# Patient Record
Sex: FEMALE | Race: WHITE | NOT HISPANIC OR LATINO | Employment: OTHER
[De-identification: names, ages, dates, MRNs, and addresses within clinical notes are randomized per-mention and may not be internally consistent; named-entity substitution may affect disease eponyms.]

---

## 2021-05-05 ENCOUNTER — TRANSCRIBE ORDERS (OUTPATIENT)
Dept: OTHER | Age: 81
End: 2021-05-05

## 2021-05-05 DIAGNOSIS — K86.1 CHRONIC CALCIFIC PANCREATITIS (H): Primary | ICD-10-CM

## 2021-05-06 ENCOUNTER — TELEPHONE (OUTPATIENT)
Dept: GASTROENTEROLOGY | Facility: CLINIC | Age: 81
End: 2021-05-06

## 2021-05-06 NOTE — TELEPHONE ENCOUNTER
Advanced Endoscopy     Referring provider:  Kathy Meza from [facility]    Referred to: [facility] Endoscopy [facility]     Provider Requested:  NA     Referral Received: 5/6/21     Records received: in CareEverywhere     Images received: none yet    Evaluation for:  idiopathic chronic calcific pancreatitis     Clinical History (per RN review):     Admission 5/3/21- 5/5/21  Hospital Course   Patient is 80 years old woman with medical condition including chronic idiopathic pancreatitis with known strictures and pancreatic duct dilatation, recurrent pancreatitis flareups, type 2 diabetes mellitus, non-insulin-dependent, asthma, hypertension, dyslipidemia who presented to outside hospital with complaint of abdominal pain, CT abdomen with concerns of choledocholithiasis, (It was an erroneous read, per radiologist report there is no evidence of choledocholithiasis, stones present in pancreatic duct). She was transferred to [facility] for GI consult and possible ERCP. Patient was seen in consultation by GI. In absence of elevated lipase and improving pain, she was not felt to have acute exacerbation of pancreatitis. Stool for elastase ordered to make sure she is on proper dosing of Creon. For management of chronic pancreatitis, GI placed a consult with [facility] biliary program.    CT 5/3/21- requested mailed 5/6/21    CONCLUSION:Chronic findings again identified throughout the abdomen and pelvis; particularly,   finding compatible with severe chronic pancreatitis appears quiescent on current examination.    The stones described by [facility] within the common bile duct are indeed within the pancreatic duct   and they are the result of chronic pancreatitis.  There is no evidence of choledocholithiasis.    Cholelithiasis again identified however but there are no inflammatory changes today   surrounding the pancreas.  No other acute abdominal or pelvic abnormalities are seen.  No other   discrete interval changes are  noted.  Incidentally, tiny pulmonary nodule at the right lung   base stable since 2019 and therefore definitely benign and needs no further evaluation or   monitoring.      MRCP 12/2020- requested push 5/6/21  IMPRESSION:   1.  Since previous examination there has been interval decrease in   the size of the pancreatic duct. Currently this measures up to 4 mm   in size and has a beaded appearance likely due to chronic changes of   pancreatitis. No evidence for acute pancreatitis.  2.  There are several small cystic areas within the pancreatic   parenchyma along the main pancreatic duct. These were present on the   previous MRI and most likely reflect residual side branch ductal   dilatation although these too have decreased in size. Less likely   consideration would be small residual pseudocysts or side branch IPMT.  3.  Benign appearing hepatic cyst requiring no follow-up.  4.  Cholelithiasis.  5.  There is no evidence for biliary ductal dilatation.    MD review date: 5/6/21  MD Decision for clinic consultation/Orders:            Referral updates/Patient contacted:

## 2021-05-13 NOTE — TELEPHONE ENCOUNTER
Per Dr. Haines  Clinic after imaging arrives.    Imaging previously requested mailed/pushed 5/6, not received yet. Called to  Update patient.     Left message.    ML

## 2021-05-17 NOTE — TELEPHONE ENCOUNTER
Patients sister Susan called, noting patient admitted again with flare and plans follow locally at this time. They will reach out with future needs.    ML

## 2021-09-10 PROCEDURE — 88305 TISSUE EXAM BY PATHOLOGIST: CPT | Mod: TC,ORL | Performed by: INTERNAL MEDICINE

## 2021-09-10 PROCEDURE — 88305 TISSUE EXAM BY PATHOLOGIST: CPT | Performed by: PATHOLOGY

## 2021-09-13 ENCOUNTER — LAB REQUISITION (OUTPATIENT)
Dept: LAB | Facility: CLINIC | Age: 81
End: 2021-09-13
Payer: MEDICARE

## 2021-09-13 DIAGNOSIS — K92.2 GASTROINTESTINAL HEMORRHAGE, UNSPECIFIED: ICD-10-CM

## 2021-09-14 LAB
PATH REPORT.COMMENTS IMP SPEC: NORMAL
PATH REPORT.COMMENTS IMP SPEC: NORMAL
PATH REPORT.FINAL DX SPEC: NORMAL
PATH REPORT.GROSS SPEC: NORMAL
PATH REPORT.MICROSCOPIC SPEC OTHER STN: NORMAL
PATH REPORT.RELEVANT HX SPEC: NORMAL
PHOTO IMAGE: NORMAL

## 2021-09-14 PROCEDURE — 88305 TISSUE EXAM BY PATHOLOGIST: CPT | Mod: 26 | Performed by: PATHOLOGY

## 2023-07-09 ENCOUNTER — NURSE TRIAGE (OUTPATIENT)
Dept: NURSING | Facility: CLINIC | Age: 83
End: 2023-07-09
Payer: MEDICARE

## 2023-07-09 NOTE — TELEPHONE ENCOUNTER
Patient has procedure tomorrow with Dr Foreman wondering if she should take her medications in the morning or hold them. No notes from [facility] or nothing seen from external sourse for pre op, patient does not have to arrive until 11:30 so writer told her to call [facility] in the morning.   Cherry Mckenzie RN on 7/9/2023 at 4:44 PM      Reason for Disposition    [1] Caller requesting NON-URGENT health information AND [2] PCP's office is the best resource    Additional Information    Negative: [1] Caller is not with the adult (patient) AND [2] reporting urgent symptoms    Negative: Lab result questions    Negative: Medication questions    Negative: Caller can't be reached by phone    Negative: Caller has already spoken to PCP or another triager    Negative: RN needs further essential information from caller in order to complete triage    Protocols used: INFORMATION ONLY CALL - NO TRIAGE-A-

## 2023-07-10 ENCOUNTER — ANESTHESIA EVENT (OUTPATIENT)
Dept: SURGERY | Facility: CLINIC | Age: 83
End: 2023-07-10
Payer: MEDICARE

## 2023-07-10 ENCOUNTER — ANESTHESIA (OUTPATIENT)
Dept: SURGERY | Facility: CLINIC | Age: 83
End: 2023-07-10
Payer: MEDICARE

## 2023-07-10 ENCOUNTER — HOSPITAL ENCOUNTER (OUTPATIENT)
Facility: CLINIC | Age: 83
Setting detail: OBSERVATION
Discharge: HOME | End: 2023-07-11
Attending: INTERNAL MEDICINE | Admitting: STUDENT IN AN ORGANIZED HEALTH CARE EDUCATION/TRAINING PROGRAM
Payer: MEDICARE

## 2023-07-10 ENCOUNTER — APPOINTMENT (OUTPATIENT)
Dept: GENERAL RADIOLOGY | Facility: CLINIC | Age: 83
End: 2023-07-10
Attending: INTERNAL MEDICINE
Payer: MEDICARE

## 2023-07-10 ENCOUNTER — TELEPHONE (OUTPATIENT)
Dept: NURSING | Facility: CLINIC | Age: 83
End: 2023-07-10
Payer: MEDICARE

## 2023-07-10 DIAGNOSIS — Z98.890 S/P ERCP: Primary | ICD-10-CM

## 2023-07-10 LAB
ERCP: NORMAL
UPPER EUS: NORMAL

## 2023-07-10 PROCEDURE — 99222 1ST HOSP IP/OBS MODERATE 55: CPT | Mod: AI | Performed by: PHYSICIAN ASSISTANT

## 2023-07-10 PROCEDURE — 250N000011 HC RX IP 250 OP 636: Mod: JZ | Performed by: PHYSICIAN ASSISTANT

## 2023-07-10 PROCEDURE — 250N000013 HC RX MED GY IP 250 OP 250 PS 637: Performed by: PHYSICIAN ASSISTANT

## 2023-07-10 PROCEDURE — 360N000083 HC SURGERY LEVEL 3 W/ FLUORO, PER MIN: Performed by: INTERNAL MEDICINE

## 2023-07-10 PROCEDURE — C1769 GUIDE WIRE: HCPCS | Performed by: INTERNAL MEDICINE

## 2023-07-10 PROCEDURE — 272N000001 HC OR GENERAL SUPPLY STERILE: Performed by: INTERNAL MEDICINE

## 2023-07-10 PROCEDURE — 96361 HYDRATE IV INFUSION ADD-ON: CPT

## 2023-07-10 PROCEDURE — 96375 TX/PRO/DX INJ NEW DRUG ADDON: CPT

## 2023-07-10 PROCEDURE — 250N000009 HC RX 250: Performed by: ANESTHESIOLOGY

## 2023-07-10 PROCEDURE — 999N000141 HC STATISTIC PRE-PROCEDURE NURSING ASSESSMENT: Performed by: INTERNAL MEDICINE

## 2023-07-10 PROCEDURE — 250N000011 HC RX IP 250 OP 636: Performed by: ANESTHESIOLOGY

## 2023-07-10 PROCEDURE — 710N000009 HC RECOVERY PHASE 1, LEVEL 1, PER MIN: Performed by: INTERNAL MEDICINE

## 2023-07-10 PROCEDURE — 74330 X-RAY BILE/PANC ENDOSCOPY: CPT | Mod: TC

## 2023-07-10 PROCEDURE — 370N000017 HC ANESTHESIA TECHNICAL FEE, PER MIN: Performed by: INTERNAL MEDICINE

## 2023-07-10 PROCEDURE — 258N000003 HC RX IP 258 OP 636: Performed by: ANESTHESIOLOGY

## 2023-07-10 PROCEDURE — 250N000025 HC SEVOFLURANE, PER MIN: Performed by: INTERNAL MEDICINE

## 2023-07-10 PROCEDURE — 258N000003 HC RX IP 258 OP 636: Performed by: INTERNAL MEDICINE

## 2023-07-10 PROCEDURE — G0378 HOSPITAL OBSERVATION PER HR: HCPCS

## 2023-07-10 PROCEDURE — 96376 TX/PRO/DX INJ SAME DRUG ADON: CPT | Mod: XU

## 2023-07-10 PROCEDURE — 250N000011 HC RX IP 250 OP 636: Mod: JZ | Performed by: ANESTHESIOLOGY

## 2023-07-10 PROCEDURE — 96374 THER/PROPH/DIAG INJ IV PUSH: CPT

## 2023-07-10 PROCEDURE — 258N000003 HC RX IP 258 OP 636: Performed by: NURSE ANESTHETIST, CERTIFIED REGISTERED

## 2023-07-10 PROCEDURE — C2617 STENT, NON-COR, TEM W/O DEL: HCPCS | Performed by: INTERNAL MEDICINE

## 2023-07-10 DEVICE — IMPLANTABLE DEVICE: Type: IMPLANTABLE DEVICE | Site: PANCREATIC DUCT | Status: FUNCTIONAL

## 2023-07-10 DEVICE — COTTON-LEUNG BILIARY STENT
Type: IMPLANTABLE DEVICE | Site: BILE DUCT | Status: FUNCTIONAL
Brand: COTTON-LEUNG

## 2023-07-10 RX ORDER — NALOXONE HYDROCHLORIDE 0.4 MG/ML
0.2 INJECTION, SOLUTION INTRAMUSCULAR; INTRAVENOUS; SUBCUTANEOUS
Status: DISCONTINUED | OUTPATIENT
Start: 2023-07-10 | End: 2023-07-10

## 2023-07-10 RX ORDER — ONDANSETRON 2 MG/ML
4 INJECTION INTRAMUSCULAR; INTRAVENOUS EVERY 6 HOURS PRN
Status: DISCONTINUED | OUTPATIENT
Start: 2023-07-10 | End: 2023-07-11 | Stop reason: HOSPADM

## 2023-07-10 RX ORDER — HYDROMORPHONE HCL IN WATER/PF 6 MG/30 ML
0.2 PATIENT CONTROLLED ANALGESIA SYRINGE INTRAVENOUS
Status: DISCONTINUED | OUTPATIENT
Start: 2023-07-10 | End: 2023-07-11 | Stop reason: HOSPADM

## 2023-07-10 RX ORDER — ACETAMINOPHEN 650 MG/1
650 SUPPOSITORY RECTAL EVERY 6 HOURS PRN
Status: DISCONTINUED | OUTPATIENT
Start: 2023-07-10 | End: 2023-07-11 | Stop reason: HOSPADM

## 2023-07-10 RX ORDER — SODIUM CHLORIDE, SODIUM LACTATE, POTASSIUM CHLORIDE, CALCIUM CHLORIDE 600; 310; 30; 20 MG/100ML; MG/100ML; MG/100ML; MG/100ML
INJECTION, SOLUTION INTRAVENOUS CONTINUOUS PRN
Status: DISCONTINUED | OUTPATIENT
Start: 2023-07-10 | End: 2023-07-10

## 2023-07-10 RX ORDER — SODIUM CHLORIDE, SODIUM LACTATE, POTASSIUM CHLORIDE, CALCIUM CHLORIDE 600; 310; 30; 20 MG/100ML; MG/100ML; MG/100ML; MG/100ML
INJECTION, SOLUTION INTRAVENOUS CONTINUOUS
Status: DISCONTINUED | OUTPATIENT
Start: 2023-07-10 | End: 2023-07-11 | Stop reason: HOSPADM

## 2023-07-10 RX ORDER — NALOXONE HYDROCHLORIDE 0.4 MG/ML
0.4 INJECTION, SOLUTION INTRAMUSCULAR; INTRAVENOUS; SUBCUTANEOUS
Status: DISCONTINUED | OUTPATIENT
Start: 2023-07-10 | End: 2023-07-11 | Stop reason: HOSPADM

## 2023-07-10 RX ORDER — LIDOCAINE 40 MG/G
CREAM TOPICAL
Status: DISCONTINUED | OUTPATIENT
Start: 2023-07-10 | End: 2023-07-11 | Stop reason: HOSPADM

## 2023-07-10 RX ORDER — AMOXICILLIN 250 MG
2 CAPSULE ORAL 2 TIMES DAILY PRN
Status: DISCONTINUED | OUTPATIENT
Start: 2023-07-10 | End: 2023-07-11 | Stop reason: HOSPADM

## 2023-07-10 RX ORDER — HYDROMORPHONE HCL IN WATER/PF 6 MG/30 ML
0.4 PATIENT CONTROLLED ANALGESIA SYRINGE INTRAVENOUS EVERY 5 MIN PRN
Status: DISCONTINUED | OUTPATIENT
Start: 2023-07-10 | End: 2023-07-10 | Stop reason: HOSPADM

## 2023-07-10 RX ORDER — ONDANSETRON 4 MG/1
4 TABLET, ORALLY DISINTEGRATING ORAL EVERY 30 MIN PRN
Status: DISCONTINUED | OUTPATIENT
Start: 2023-07-10 | End: 2023-07-10 | Stop reason: HOSPADM

## 2023-07-10 RX ORDER — CHOLECALCIFEROL (VITAMIN D3) 25 MCG
2000 CAPSULE ORAL DAILY
COMMUNITY

## 2023-07-10 RX ORDER — LIDOCAINE 40 MG/G
CREAM TOPICAL
Status: DISCONTINUED | OUTPATIENT
Start: 2023-07-10 | End: 2023-07-10 | Stop reason: HOSPADM

## 2023-07-10 RX ORDER — DEXAMETHASONE SODIUM PHOSPHATE 4 MG/ML
INJECTION, SOLUTION INTRA-ARTICULAR; INTRALESIONAL; INTRAMUSCULAR; INTRAVENOUS; SOFT TISSUE PRN
Status: DISCONTINUED | OUTPATIENT
Start: 2023-07-10 | End: 2023-07-10

## 2023-07-10 RX ORDER — ONDANSETRON 2 MG/ML
4 INJECTION INTRAMUSCULAR; INTRAVENOUS EVERY 6 HOURS PRN
Status: DISCONTINUED | OUTPATIENT
Start: 2023-07-10 | End: 2023-07-10

## 2023-07-10 RX ORDER — FAMOTIDINE 40 MG/1
20 TABLET, FILM COATED ORAL 2 TIMES DAILY
COMMUNITY

## 2023-07-10 RX ORDER — SODIUM CHLORIDE, SODIUM LACTATE, POTASSIUM CHLORIDE, CALCIUM CHLORIDE 600; 310; 30; 20 MG/100ML; MG/100ML; MG/100ML; MG/100ML
INJECTION, SOLUTION INTRAVENOUS CONTINUOUS
Status: DISCONTINUED | OUTPATIENT
Start: 2023-07-10 | End: 2023-07-10 | Stop reason: HOSPADM

## 2023-07-10 RX ORDER — CHLORAL HYDRATE 500 MG
2 CAPSULE ORAL DAILY
COMMUNITY

## 2023-07-10 RX ORDER — NALOXONE HYDROCHLORIDE 0.4 MG/ML
0.4 INJECTION, SOLUTION INTRAMUSCULAR; INTRAVENOUS; SUBCUTANEOUS
Status: DISCONTINUED | OUTPATIENT
Start: 2023-07-10 | End: 2023-07-10

## 2023-07-10 RX ORDER — IPRATROPIUM BROMIDE AND ALBUTEROL SULFATE 2.5; .5 MG/3ML; MG/3ML
1 SOLUTION RESPIRATORY (INHALATION) 4 TIMES DAILY PRN
COMMUNITY

## 2023-07-10 RX ORDER — FLUTICASONE FUROATE AND VILANTEROL 100; 25 UG/1; UG/1
1 POWDER RESPIRATORY (INHALATION) DAILY
Status: DISCONTINUED | OUTPATIENT
Start: 2023-07-11 | End: 2023-07-11

## 2023-07-10 RX ORDER — LISINOPRIL 10 MG/1
10 TABLET ORAL DAILY
COMMUNITY

## 2023-07-10 RX ORDER — ALBUTEROL SULFATE 90 UG/1
1-2 AEROSOL, METERED RESPIRATORY (INHALATION) EVERY 4 HOURS PRN
Status: DISCONTINUED | OUTPATIENT
Start: 2023-07-10 | End: 2023-07-11 | Stop reason: HOSPADM

## 2023-07-10 RX ORDER — FLUTICASONE PROPIONATE AND SALMETEROL XINAFOATE 115; 21 UG/1; UG/1
2 AEROSOL, METERED RESPIRATORY (INHALATION) 2 TIMES DAILY
COMMUNITY

## 2023-07-10 RX ORDER — CETIRIZINE HYDROCHLORIDE 10 MG/1
10 TABLET ORAL DAILY PRN
COMMUNITY

## 2023-07-10 RX ORDER — ACETAMINOPHEN 325 MG/1
975 TABLET ORAL ONCE
Status: CANCELLED | OUTPATIENT
Start: 2023-07-10 | End: 2023-07-10

## 2023-07-10 RX ORDER — ONDANSETRON 2 MG/ML
INJECTION INTRAMUSCULAR; INTRAVENOUS PRN
Status: DISCONTINUED | OUTPATIENT
Start: 2023-07-10 | End: 2023-07-10

## 2023-07-10 RX ORDER — OXYCODONE HYDROCHLORIDE 5 MG/1
5 TABLET ORAL EVERY 4 HOURS PRN
Status: DISCONTINUED | OUTPATIENT
Start: 2023-07-10 | End: 2023-07-11 | Stop reason: HOSPADM

## 2023-07-10 RX ORDER — GABAPENTIN 100 MG/1
100 CAPSULE ORAL
COMMUNITY

## 2023-07-10 RX ORDER — FENTANYL CITRATE 50 UG/ML
25 INJECTION, SOLUTION INTRAMUSCULAR; INTRAVENOUS EVERY 5 MIN PRN
Status: DISCONTINUED | OUTPATIENT
Start: 2023-07-10 | End: 2023-07-10 | Stop reason: HOSPADM

## 2023-07-10 RX ORDER — NALOXONE HYDROCHLORIDE 0.4 MG/ML
0.2 INJECTION, SOLUTION INTRAMUSCULAR; INTRAVENOUS; SUBCUTANEOUS
Status: DISCONTINUED | OUTPATIENT
Start: 2023-07-10 | End: 2023-07-11 | Stop reason: HOSPADM

## 2023-07-10 RX ORDER — FAMOTIDINE 20 MG/1
20 TABLET, FILM COATED ORAL 2 TIMES DAILY
Status: DISCONTINUED | OUTPATIENT
Start: 2023-07-10 | End: 2023-07-11

## 2023-07-10 RX ORDER — FENTANYL CITRATE 50 UG/ML
50 INJECTION, SOLUTION INTRAMUSCULAR; INTRAVENOUS EVERY 5 MIN PRN
Status: DISCONTINUED | OUTPATIENT
Start: 2023-07-10 | End: 2023-07-10 | Stop reason: HOSPADM

## 2023-07-10 RX ORDER — FENTANYL CITRATE 50 UG/ML
INJECTION, SOLUTION INTRAMUSCULAR; INTRAVENOUS PRN
Status: DISCONTINUED | OUTPATIENT
Start: 2023-07-10 | End: 2023-07-10

## 2023-07-10 RX ORDER — LANOLIN ALCOHOL/MO/W.PET/CERES
1000 CREAM (GRAM) TOPICAL WEEKLY
COMMUNITY

## 2023-07-10 RX ORDER — FLUMAZENIL 0.1 MG/ML
0.2 INJECTION, SOLUTION INTRAVENOUS
Status: ACTIVE | OUTPATIENT
Start: 2023-07-10 | End: 2023-07-11

## 2023-07-10 RX ORDER — ACETAMINOPHEN 325 MG/1
650 TABLET ORAL EVERY 6 HOURS PRN
Status: DISCONTINUED | OUTPATIENT
Start: 2023-07-10 | End: 2023-07-11 | Stop reason: HOSPADM

## 2023-07-10 RX ORDER — FUROSEMIDE 20 MG
20 TABLET ORAL DAILY
COMMUNITY

## 2023-07-10 RX ORDER — LIDOCAINE HYDROCHLORIDE 20 MG/ML
INJECTION, SOLUTION INFILTRATION; PERINEURAL PRN
Status: DISCONTINUED | OUTPATIENT
Start: 2023-07-10 | End: 2023-07-10

## 2023-07-10 RX ORDER — PROPOFOL 10 MG/ML
INJECTION, EMULSION INTRAVENOUS PRN
Status: DISCONTINUED | OUTPATIENT
Start: 2023-07-10 | End: 2023-07-10

## 2023-07-10 RX ORDER — LISINOPRIL 10 MG/1
10 TABLET ORAL DAILY
Status: DISCONTINUED | OUTPATIENT
Start: 2023-07-11 | End: 2023-07-11 | Stop reason: HOSPADM

## 2023-07-10 RX ORDER — POLYETHYLENE GLYCOL 3350 17 G/17G
17 POWDER, FOR SOLUTION ORAL DAILY PRN
Status: DISCONTINUED | OUTPATIENT
Start: 2023-07-10 | End: 2023-07-11 | Stop reason: HOSPADM

## 2023-07-10 RX ORDER — LABETALOL HYDROCHLORIDE 5 MG/ML
10 INJECTION, SOLUTION INTRAVENOUS
Status: DISCONTINUED | OUTPATIENT
Start: 2023-07-10 | End: 2023-07-10 | Stop reason: HOSPADM

## 2023-07-10 RX ORDER — ACETAMINOPHEN 500 MG
1000 TABLET ORAL EVERY 6 HOURS PRN
COMMUNITY

## 2023-07-10 RX ORDER — AMOXICILLIN 250 MG
1 CAPSULE ORAL 2 TIMES DAILY PRN
Status: DISCONTINUED | OUTPATIENT
Start: 2023-07-10 | End: 2023-07-11 | Stop reason: HOSPADM

## 2023-07-10 RX ORDER — HYDROMORPHONE HCL IN WATER/PF 6 MG/30 ML
0.2 PATIENT CONTROLLED ANALGESIA SYRINGE INTRAVENOUS EVERY 5 MIN PRN
Status: DISCONTINUED | OUTPATIENT
Start: 2023-07-10 | End: 2023-07-10 | Stop reason: HOSPADM

## 2023-07-10 RX ORDER — ONDANSETRON 2 MG/ML
4 INJECTION INTRAMUSCULAR; INTRAVENOUS EVERY 30 MIN PRN
Status: DISCONTINUED | OUTPATIENT
Start: 2023-07-10 | End: 2023-07-10 | Stop reason: HOSPADM

## 2023-07-10 RX ORDER — DIMENHYDRINATE 50 MG/ML
25 INJECTION, SOLUTION INTRAMUSCULAR; INTRAVENOUS
Status: DISCONTINUED | OUTPATIENT
Start: 2023-07-10 | End: 2023-07-10 | Stop reason: HOSPADM

## 2023-07-10 RX ORDER — ONDANSETRON 4 MG/1
4 TABLET, ORALLY DISINTEGRATING ORAL EVERY 6 HOURS PRN
Status: DISCONTINUED | OUTPATIENT
Start: 2023-07-10 | End: 2023-07-11 | Stop reason: HOSPADM

## 2023-07-10 RX ORDER — ONDANSETRON 4 MG/1
4 TABLET, ORALLY DISINTEGRATING ORAL EVERY 6 HOURS PRN
Status: DISCONTINUED | OUTPATIENT
Start: 2023-07-10 | End: 2023-07-10

## 2023-07-10 RX ORDER — HYDRALAZINE HYDROCHLORIDE 20 MG/ML
2.5-5 INJECTION INTRAMUSCULAR; INTRAVENOUS EVERY 10 MIN PRN
Status: DISCONTINUED | OUTPATIENT
Start: 2023-07-10 | End: 2023-07-10 | Stop reason: HOSPADM

## 2023-07-10 RX ADMIN — ONDANSETRON 4 MG: 2 INJECTION INTRAMUSCULAR; INTRAVENOUS at 14:53

## 2023-07-10 RX ADMIN — PHENYLEPHRINE HYDROCHLORIDE 100 MCG: 10 INJECTION INTRAVENOUS at 14:15

## 2023-07-10 RX ADMIN — LIDOCAINE HYDROCHLORIDE 100 MG: 20 INJECTION, SOLUTION INFILTRATION; PERINEURAL at 14:03

## 2023-07-10 RX ADMIN — SODIUM CHLORIDE, POTASSIUM CHLORIDE, SODIUM LACTATE AND CALCIUM CHLORIDE: 600; 310; 30; 20 INJECTION, SOLUTION INTRAVENOUS at 13:56

## 2023-07-10 RX ADMIN — PHENYLEPHRINE HYDROCHLORIDE 100 MCG: 10 INJECTION INTRAVENOUS at 14:36

## 2023-07-10 RX ADMIN — FENTANYL CITRATE 50 MCG: 50 INJECTION, SOLUTION INTRAMUSCULAR; INTRAVENOUS at 14:03

## 2023-07-10 RX ADMIN — PROPOFOL 30 MG: 10 INJECTION, EMULSION INTRAVENOUS at 14:21

## 2023-07-10 RX ADMIN — HYDROMORPHONE HYDROCHLORIDE 0.2 MG: 0.2 INJECTION, SOLUTION INTRAMUSCULAR; INTRAVENOUS; SUBCUTANEOUS at 23:36

## 2023-07-10 RX ADMIN — FAMOTIDINE 20 MG: 20 TABLET ORAL at 20:09

## 2023-07-10 RX ADMIN — DEXAMETHASONE SODIUM PHOSPHATE 4 MG: 4 INJECTION, SOLUTION INTRA-ARTICULAR; INTRALESIONAL; INTRAMUSCULAR; INTRAVENOUS; SOFT TISSUE at 14:12

## 2023-07-10 RX ADMIN — SUCCINYLCHOLINE CHLORIDE 120 MG: 20 INJECTION, SOLUTION INTRAMUSCULAR; INTRAVENOUS; PARENTERAL at 14:03

## 2023-07-10 RX ADMIN — SODIUM CHLORIDE, POTASSIUM CHLORIDE, SODIUM LACTATE AND CALCIUM CHLORIDE: 600; 310; 30; 20 INJECTION, SOLUTION INTRAVENOUS at 20:09

## 2023-07-10 RX ADMIN — FENTANYL CITRATE 25 MCG: 50 INJECTION, SOLUTION INTRAMUSCULAR; INTRAVENOUS at 15:14

## 2023-07-10 RX ADMIN — FENTANYL CITRATE 25 MCG: 50 INJECTION, SOLUTION INTRAMUSCULAR; INTRAVENOUS at 15:24

## 2023-07-10 RX ADMIN — ONDANSETRON 4 MG: 2 INJECTION INTRAMUSCULAR; INTRAVENOUS at 23:36

## 2023-07-10 RX ADMIN — ONDANSETRON 4 MG: 2 INJECTION INTRAMUSCULAR; INTRAVENOUS at 14:26

## 2023-07-10 RX ADMIN — PROPOFOL 170 MG: 10 INJECTION, EMULSION INTRAVENOUS at 14:03

## 2023-07-10 RX ADMIN — HYDROMORPHONE HYDROCHLORIDE 0.2 MG: 0.2 INJECTION, SOLUTION INTRAMUSCULAR; INTRAVENOUS; SUBCUTANEOUS at 20:09

## 2023-07-10 RX ADMIN — PHENYLEPHRINE HYDROCHLORIDE 150 MCG: 10 INJECTION INTRAVENOUS at 14:20

## 2023-07-10 ASSESSMENT — ACTIVITIES OF DAILY LIVING (ADL)
ADLS_ACUITY_SCORE: 35
ADLS_ACUITY_SCORE: 35
ADLS_ACUITY_SCORE: 31
ADLS_ACUITY_SCORE: 35

## 2023-07-10 NOTE — ANESTHESIA POSTPROCEDURE EVALUATION
Patient: Thais Vigil    Procedure: Procedure(s):  Endoscopic retrograde cholangiopancreatogram, stent placement, endoscopic ultrasound       Anesthesia Type:  General    Note:     Postop Pain Control: Uneventful            Sign Out: Well controlled pain   PONV: No   Neuro/Psych: Uneventful            Sign Out: Acceptable/Baseline neuro status   Airway/Respiratory: Uneventful            Sign Out: Acceptable/Baseline resp. status   CV/Hemodynamics: Uneventful            Sign Out: Acceptable CV status   Other NRE: NONE   DID A NON-ROUTINE EVENT OCCUR?            Last vitals:  Vitals Value Taken Time   /55 07/10/23 1545   Temp 36.4  C (97.6  F) 07/10/23 1500   Pulse 68 07/10/23 1546   Resp 12 07/10/23 1546   SpO2 99 % 07/10/23 1546   Vitals shown include unvalidated device data.    Electronically Signed By: Arturo Valdez MD  July 10, 2023  3:47 PM

## 2023-07-10 NOTE — TELEPHONE ENCOUNTER
Patient has a procedure this morning and is calling to ask if she should be taking her medication. Procedure is with Dr. Foreman.  Connected patient to Dr. Foreman's office and instructed patient to ask for the on call provider.   Kathleen Andino RN   07/10/23 6:50 AM  [facility] Nurse Advisor

## 2023-07-10 NOTE — ANESTHESIA PREPROCEDURE EVALUATION
Anesthesia Pre-Procedure Evaluation    Patient: Thais Vigil   MRN: [id number] : 1940        Procedure : Procedure(s):  Endoscopic retrograde cholangiopancreatogram          No past medical history on file.   No past surgical history on file.   Allergies   Allergen Reactions     Statins Muscle Pain (Myalgia)     Bactrim [Sulfamethoxazole-Trimethoprim] Rash      Social History     Tobacco Use     Smoking status: Not on file     Smokeless tobacco: Not on file   Substance Use Topics     Alcohol use: Not on file      Wt Readings from Last 1 Encounters:   No data found for Wt        Anesthesia Evaluation   Pt has had prior anesthetic. Type: General.    No history of anesthetic complications       ROS/MED HX  ENT/Pulmonary:       Neurologic:       Cardiovascular:     (+) Dyslipidemia hypertension-----Taking blood thinners     METS/Exercise Tolerance:     Hematologic:       Musculoskeletal:       GI/Hepatic: Comment: Recurrent pancreatitis      Renal/Genitourinary:       Endo:       Psychiatric/Substance Use:       Infectious Disease:       Malignancy:       Other:            Physical Exam    Airway        Mallampati: II   TM distance: > 3 FB   Neck ROM: full   Mouth opening: > 3 cm    Respiratory Devices and Support         Dental       (+) Minor Abnormalities - some fillings, tiny chips      Cardiovascular          Rhythm and rate: regular and normal     Pulmonary           breath sounds clear to auscultation           OUTSIDE LABS:  CBC: No results found for: WBC, HGB, HCT, PLT  BMP: No results found for: NA, POTASSIUM, CHLORIDE, CO2, BUN, CR, GLC  COAGS: No results found for: PTT, INR, FIBR  POC: No results found for: BGM, HCG, HCGS  HEPATIC: No results found for: ALBUMIN, PROTTOTAL, ALT, AST, GGT, ALKPHOS, BILITOTAL, BILIDIRECT, JUAN  OTHER: No results found for: PH, LACT, A1C, ANA, PHOS, MAG, LIPASE, AMYLASE, TSH, T4, T3, CRP, SED    Anesthesia Plan    ASA Status:  2   NPO Status:  NPO Appropriate     Anesthesia Type: General.     - Airway: ETT   Induction: Intravenous.   Maintenance: Balanced.        Consents    Anesthesia Plan(s) and associated risks, benefits, and realistic alternatives discussed. Questions answered and patient/representative(s) expressed understanding.    - Discussed:     - Discussed with:  Patient         Postoperative Care    Pain management: IV analgesics, Oral pain medications, Multi-modal analgesia.   PONV prophylaxis: Ondansetron (or other 5HT-3)     Comments:                Arturo Valdez MD

## 2023-07-10 NOTE — H&P
"[facility]  Pre-Endoscopy History and Physical     Thais Vigil MRN# [id number]   YOB: 1940 Age: 82 year old     Date of Procedure: 7/10/2023  Primary care provider: Maia Christy  Type of Endoscopy: Endoscopic Retrograde Cholangiopancreatography (ERCP)  Reason for Procedure: Ch. pancreatitis  Type of Anesthesia Anticipated: MAC    HPI:    Thais is a 82 year old female who will be undergoing the above procedure.      A history and physical has been performed. The patient's medications and allergies have been reviewed. The risks and benefits of the procedure and the sedation options and risks were discussed with the patient.  All questions were answered and informed consent was obtained.      She denies a personal or family history of anesthesia complications or bleeding disorders.     Allergies   Allergen Reactions     Statins Muscle Pain (Myalgia)     Bactrim [Sulfamethoxazole-Trimethoprim] Rash        None       There is no problem list on file for this patient.       No past medical history on file.     No past surgical history on file.    Social History     Tobacco Use     Smoking status: Not on file     Smokeless tobacco: Not on file   Substance Use Topics     Alcohol use: Not on file       No family history on file.    REVIEW OF SYSTEMS:     5 point ROS negative except as noted above in HPI, including Gen., Resp., CV, GI &  system review.      PHYSICAL EXAM:   BP (!) 145/60   Pulse 96   Temp 97.4  F (36.3  C) (Temporal)   Resp 16   Ht 1.588 m (5' 2.5\")   Wt 63.5 kg (140 lb)   SpO2 98%   BMI 25.20 kg/m   Estimated body mass index is 25.2 kg/m  as calculated from the following:    Height as of this encounter: 1.588 m (5' 2.5\").    Weight as of this encounter: 63.5 kg (140 lb).   GENERAL APPEARANCE: healthy, alert and no distress  MENTAL STATUS: alert  AIRWAY EXAM: Mallampatti Class I (visualization of the soft palate, fauces, uvula, anterior and posterior " pillars)  RESP: lungs clear to auscultation - no rales, rhonchi or wheezes  CV: normal S1 S2, no S3 or S4      DIAGNOSTICS:    Not indicated      IMPRESSION   ASA Class 2 - Mild systemic disease        PLAN:       Plan for Endoscopic Retrograde Cholangiopancreatography (ERCP). We discussed the risks, benefits and alternatives and the patient wished to proceed.    The above has been forwarded to the consulting provider.      Signed Electronically by: Poli Foreman MD,MD  July 10, 2023      [facility]  Ph: [phone] Fax: [phone]

## 2023-07-10 NOTE — H&P (VIEW-ONLY)
Formatting of this note is different from the original.  Patient:  Thais Vigil  MRN [id number]  82 Y female    Date of Service:  6/13/2023  Provider: Patsy Mcgarry MD    History:  Chief Complaint   Patient presents with   ? Abdominal Pain     HPI    82 years old female patient with history of chronic recurrent idiopathic pancreatitis presented to the ER with a chief complaint of right normal back exam, No tenderness, No CVA tenderness.  In the right anterior chest wall discomfort since yesterday.  Patient stated that she was in and out of floor lawnmoawer yesterday and is wondering if she pulled a muscle.  Pain is constant, waxing and waning.  Associated with nausea but no vomiting slight shortness of breath.  Denies any fever or chills.  Denies any diarrhea or constipation.  Denies any urinary symptoms.  Past Medical History:   Diagnosis Date   ? Acute cholecystitis 5/17/2021   ? Anorexia     history   ? Chronic back pain    ? Coagulopathy (HCC) 5/18/2021   ? Dry skin    ? Early satiety    ? Hearing loss    ? History of weight loss    ? Hyperlipidemia    ? Hypertension    ? Keratosis    ? Liver spot    ? Pancreatic cyst    ? Rheumatic fever 1948     Past Surgical History:   Procedure Laterality Date   ? APPENDECTOMY  1995   ? ARTHROSCOPY, SHOULDER, SURGICAL; BICEPS TENODESIS Right 06/17/2019    Procedure: ARTHROSCOPY, SHOULDER, WITH BICEPS TENODESIS;  Surgeon: Chavo Dan MD;  Location: [facility];  Service: Orthopedics   ? BREAST BIOPSY Left 2009    Benign   ? CATARACT EXTRACTION W IOL Bilateral     Rober Gupta about 10 years ago   ? CATARACT REMOVAL Bilateral    ? COLONOSCOPY     ? LUMBAR DISCECTOMY      L 2-3   ? TONSIL AND ADENOIDECTOMY  1948   ? UGI ENDOSCOPY; W/BIOPSY, SINGLE OR MULTIPLE N/A 04/12/2019    Procedure: EGD (ESOPHAGOGASTRODUODENOSCOPY) WITH BIOPSIES, POLYPECTOMY;  Surgeon: Clifton Valentine II, MD;  [place] [facility];  Service:  Gastroenterology     Socioeconomic History   ? Marital status: Single     Spouse name: Not on file   ? Number of children: 0   ? Years of education: Not on file   ? Highest education level: Not on file   Occupational History   ? Occupation: Retired   Tobacco Use   ? Smoking status: Never     Passive exposure: Past   ? Smokeless tobacco: Never   Vaping Use   ? Vaping Use: Never used   Substance and Sexual Activity   ? Alcohol use: No   ? Drug use: No   ? Sexual activity: Not Currently     Family History   Problem Relation Name Age of Onset   ? Hypertension Mother     ? Colon Cancer Mother           of colon cancer at age 93   ? Diabetes Mother     ? Seizure Disorder Mother     ? Cataracts Mother     ? Heart Disease Father  76         from heart attack   ? Hypertension Father     ? Hyperlipidemia Father     ? Other Brother [place]          of ALS   ? Hypertension Brother Bud    ? Hyperlipidemia Brother Bud    ? Breast Cancer Paternal Aunt  78   ? Cataracts Other Family history      Allergies   Allergen Reactions   ? Mold Shortness of Breath / Difficulty Breathing   ? Bactrim [Sulfamethoprim] Rash and Fever   ? Other Other and Muscle Pain   ? Lipitor [Atorvastatin] Muscle Pain   ? Prilosec [Omeprazole] Muscle Pain   ? Protonix [Pantoprazole] Muscle Pain     aches   ? Zantac [Ranitidine Hcl] Other     Body ache   ? Zocor [Simvastatin] Muscle Pain     As of 14:40  Prior to Admission medications    Medication Sig Start Date End Date Taking? Authorizing Provider   acetaminophen (AKA: TYLENOL) 500 mg oral Tablet Take 2 Tablets (1,000 mg) by mouth every 6 hours if needed.   Yes Reported, Patient   albuterol sulfate (PROVENTIL,VENTOLIN,PROAIR) 90 mcg/actuation inhalation HFA Aerosol Inhaler 1-2 Puffs by inhalation route every 4 hours if needed for shortness of breath. 10/13/22  Yes Maia Christy MD   cetirizine (ZYRTEC) 10 mg oral Tablet Take 1 Tablet (10 mg) by mouth if needed each day  (allergies). 10/13/22   Maia Christy MD   cholecalciferol, Vitamin D3, 50 mcg (2,000 unit) oral tablet Take 1 Tablet (2,000 Units) by mouth in the morning.   Yes Reported, Patient   CREON 24,000-76,000 -120,000 unit oral Capsule, Delayed Release(E.C.) TAKE 2 CAPSULES BY MOUTH 3 TIMES DAILY WITH MEALS PLUS. 1 WITH SNACKS. 5/22/23 11/12/24 Yes Clifton Valentine II, MD   cyanocobalamin (VITAMIN B-12) 1,000 mcg oral Tablet Take 1 Tablet (1,000 mcg) by mouth every week. Dose decrease 4/1/21    Reported, Patient   docosahexanoic acid/epa (FISH OIL ORAL) Take 2,000 mg by mouth once daily.   Yes Reported, Patient   famotidine (PEPCID) 40 mg oral Tablet Take 0.5 Tablets (20 mg) by mouth in the morning and 0.5 Tablets (20 mg) in the evening. 10/13/22  Yes Maia Christy MD   fluticasone propion-salmeteroL (ADVAIR HFA) 115-21 mcg/actuation inhalation HFA Aerosol Inhaler 2 Puffs by inhalation route in the morning and 2 Puffs in the evening. 10/13/22  Yes Maia Christy MD   furosemide (LASIX) 20 mg oral Tablet Take 1 Tablet (20 mg) by mouth in the morning. Dose increase 10/13/22 10/13/23 Yes Maia Christy MD   gabapentin (NEURONTIN) 100 mg oral Capsule Take 1 Capsule (100 mg) by mouth if needed at bedtime (pain). 10/13/22 10/13/23  Maia Christy MD   ipratropium-albuteroL (DUO-NEB) 0.5 mg-3 mg(2.5 mg base)/3 mL inhalation Solution for Nebulization 3 mL by inhalation-neb route if needed in the morning, at noon, in the evening, and before bedtime for shortness of breath.  Patient not taking: Reported on 5/8/2023 10/13/22 10/13/23  Maia Christy MD   lisinopriL (ZESTRIL,PRINIVIL) 10 mg oral Tablet Take 1 Tablet (10 mg) by mouth in the morning. 10/13/22 10/13/23 Yes Maia Christy MD   multivitamin oral Tablet Take 1 Tablet by mouth in the morning.   Yes Reported, Patient     Medication Administration This Visit     Date/Time Order Dose Route Action     "06/13/2023 1448 CDT sodium chloride 0.9 % (NS) IV soln -- IV infusion New Bag/Start Infusion    06/13/2023 1448 CDT ondansetron (ZOFRAN ODT) tbdl 4 mg 4 mg oral Given    06/13/2023 1455 CDT GI Cocktail (Maalox/lidocaine) 45 mL oral Given    06/13/2023 1614 CDT morphine 4 mg/mL injection 4 mg 4 mg intravenous Given    06/13/2023 1701 CDT iohexoL (OMNIPAQUE) 350 mg/mL IV soln 125 mL 125 mL intravenous Given    06/13/2023 1701 CDT sodium chloride 0.9 % (NS) IV soln 60 mL miscellaneous Given    06/13/2023 1701 CDT iohexoL (OMNIPAQUE) 350 mg/mL IV soln 1 mL 1 mL intravenous Given    06/13/2023 1722 CDT HYDROmorphone (DILAUDID) 2 mg/mL (1 mL) injection 0.5 mg 0.5 mg intravenous Given       Review of Systems   All other systems reviewed and are negative.      Initial Vitals   BP Temp Pulse Heart Rate (cardiac monitor) Resp SpO2 O2 flow rate (LPM) O2 source Height   06/13/23 1412 06/13/23 1850 06/13/23 1412 06/14/23 2000 06/13/23 1412 06/13/23 1412 06/14/23 2300 06/13/23 1412 06/13/23 1413   159/68 98.5  F (36.9  C) 62 78 16 99 % 2 lpm Room air 1.6 m (5' 3\")     Weight           06/13/23 1413           66.7 kg (147 lb)             Physical Exam  Vitals and nursing note reviewed.   Constitutional:       General: She is not in acute distress.     Appearance: She is well-developed. She is not ill-appearing, toxic-appearing or diaphoretic.   HENT:      Head: Normocephalic and atraumatic.      Nose: Nose normal. No congestion or rhinorrhea.   Eyes:      General: No scleral icterus.        Right eye: No discharge.         Left eye: No discharge.      Extraocular Movements: Extraocular movements intact.      Conjunctiva/sclera: Conjunctivae normal.      Pupils: Pupils are equal, round, and reactive to light.   Neck:      Thyroid: No thyromegaly.      Trachea: No tracheal deviation.   Cardiovascular:      Rate and Rhythm: Normal rate and regular rhythm.      Heart sounds: Normal heart sounds. No murmur heard.     No friction rub. "   Pulmonary:      Effort: Pulmonary effort is normal. No respiratory distress.      Breath sounds: Normal breath sounds. No stridor. No wheezing, rhonchi or rales.      Comments: Tender-on palpation of the lateral and posterior right lower rib area.  Also tenderness on palpation of the anterior chest wall.  No skin rash.  Pain is reproducible  Chest:      Chest wall: Tenderness present.   Abdominal:      General: Bowel sounds are normal. There is distension.      Palpations: Abdomen is soft. There is no mass.      Tenderness: There is no abdominal tenderness. There is no right CVA tenderness, left CVA tenderness, guarding or rebound.      Hernia: No hernia is present.      Comments: Mild epigastric tenderness.  No guarding no rebound.  Negative Howard sign   Musculoskeletal:         General: No tenderness. Normal range of motion.      Cervical back: Normal range of motion and neck supple.   Skin:     General: Skin is warm and dry.   Neurological:      General: No focal deficit present.      Mental Status: She is alert and oriented to person, place, and time. Mental status is at baseline.      Cranial Nerves: No cranial nerve deficit.      Sensory: No sensory deficit.      Motor: No weakness.      Coordination: Coordination normal.      Gait: Gait normal.      Deep Tendon Reflexes: Reflexes are normal and symmetric. Reflexes normal.   Psychiatric:         Mood and Affect: Mood normal.         Behavior: Behavior normal.         Thought Content: Thought content normal.     EKG Results     None       Imaging:  No results found.    Labs:  Ordered:   Procedures   ? CBC and Differential   ? Basic Metabolic Panel   ? C-Reactive Protein, Non-Sensitive   ? Lactic Acid   ? Lipase   ? Liver Function Panel   ? CBC and Differential   ? Troponin I   ? D-Dimer   ? Comprehensive Metabolic Panel   ? CBC and Differential   ? CBC and Differential   ? CBC and Differential   ? Comprehensive Metabolic Panel   ? C-Reactive Protein,  Non-Sensitive   ? Prothrombin Time and INR   ? CBC and Differential   ? Lipase   ? CBC and Differential   ? Urinalysis with Microscopic     Labs Reviewed   BASIC METABOLIC PANEL - Abnormal       Result Value Ref Range    Sodium 140  135 - 145 mmol/L    Potassium 3.9  3.5 - 5.0 mmol/L    Chloride 100  98 - 110 mmol/L    CO2 28  22 - 32 mmol/L    Anion Gap 15.9  10.0 - 20.0 mmol/L    Creatinine 0.90  0.52 - 1.04 mg/dL    Blood Urea Nitrogen 22.0 (*) 7.0 - 21.0 mg/dL    BUN/Creatinine Ratio 24.44 (*) 10.00 - 20.00 ratio    Calcium 9.9  8.4 - 10.3 mg/dL    Glucose 105  70 - 114 mg/dL    eGFR >60  >=60 mL/min/1.73m(2)    eCrCl (Rx) - Adults 52.5  mL/min   LIVER FUNCTION PANEL - Abnormal    Albumin 4.4  3.5 - 5.1 g/dL    Total Protein 8.3  6.3 - 8.5 g/dL    Globulin 3.9 (*) 2.4 - 3.5 g/dL    Albumin/Globulin Ratio 1.1 (*) 1.2 - 2.2    Aspartate Aminotransferase (AST) 37 (*) 14 - 36 U/L    Alanine Aminotransferase (ALT) 29  0 - 35 U/L    Alkaline Phosphatase 104  38 - 126 U/L    Bilirubin, Total 0.9  0.2 - 1.3 mg/dL    Bilirubin, Indirect 0.7  0.0 - 1.1 mg/dL    Bilirubin, Direct 0.2  <=0.4 mg/dL   COMPLETE BLOOD COUNT AND DIFFERENTIAL - Abnormal    WBC Count 10.3  4.5 - 11.0 10(3)/uL    RBC Count 4.16  4.00 - 5.20 10(6)/uL    Hemoglobin 12.8  12.0 - 16.0 g/dL    Hematocrit 38.3  33.0 - 51.0 %    MCV 92.1  80.0 - 100.0 fL    MCH 30.8  26.0 - 34.0 pg    MCHC 33.4  32.0 - 36.0 g/dL    RDW 12.1  11.5 - 13.0 %    Platelets 233  150 - 450 10(3)/uL    MPV 9.5  6.5 - 10.0 fL    % Neutrophils 74.0  35.0 - 77.0 %    % Lymphocytes 17.6 (*) 24.0 - 44.0 %    % Monocytes 6.7 (*) 3.0 - 6.0 %    % Eosinophils 1.0  0.0 - 3.0 %    % Basophils 0.3  0.0 - 1.0 %    % Immature Granulocytes 0.4  0.0 - 1.1 %    Abs Neutrophils 7.6  1.5 - 8.5 10(3)/uL    Abs Lymphocytes 1.8  1.1 - 5.0 10(3)/uL    Abs Monocytes 0.7  0.1 - 0.7 10(3)/uL    Abs Eosinophils 0.1  0.0 - 0.3 10(3)/uL    Abs Basophils 0.0  0.0 - 0.1 10(3)/uL    Abs Immature Granulocytes  0.04  0.00 - 0.14 10(3)/uL   D-DIMER - Abnormal    D-Dimer 0.82 (*) <=0.50 mg/L FEU    Narrative:     The clinical cutoff value of 0.5 mg/L has a very high negative predictive value (essentially 100%) for exclusion of deep vein thrombosis and/or pulmonary embolism.    COMPREHENSIVE METABOLIC PANEL - Abnormal    Sodium 143  135 - 145 mmol/L    Potassium 4.2  3.5 - 5.0 mmol/L    Chloride 104  98 - 110 mmol/L    CO2 29  22 - 32 mmol/L    Anion Gap 14.2  10.0 - 20.0 mmol/L    Creatinine 0.90  0.52 - 1.04 mg/dL    Blood Urea Nitrogen 23.0 (*) 7.0 - 21.0 mg/dL    BUN/Creatinine Ratio 25.56 (*) 10.00 - 20.00 ratio    Calcium 8.6  8.4 - 10.3 mg/dL    Glucose 97  70 - 114 mg/dL    eGFR >60  >=60 mL/min/1.73m(2)    Total Protein 6.7  6.3 - 8.5 g/dL    Albumin 3.4 (*) 3.5 - 5.1 g/dL    Globulin 3.3  2.4 - 3.5 g/dL    Albumin/Globulin Ratio 1.0 (*) 1.2 - 2.2    Aspartate Aminotransferase (AST) 356 (*) 14 - 36 U/L    Alanine Aminotransferase (ALT) 274 (*) 0 - 35 U/L    Alkaline Phosphatase 108  38 - 126 U/L    Bilirubin, Total 1.0  0.2 - 1.3 mg/dL    eCrCl (Rx) - Adults 51.6  mL/min   COMPLETE BLOOD COUNT AND DIFFERENTIAL - Abnormal    WBC Count 8.8  4.5 - 11.0 10(3)/uL    RBC Count 3.71 (*) 4.00 - 5.20 10(6)/uL    Hemoglobin 11.4 (*) 12.0 - 16.0 g/dL    Hematocrit 34.8  33.0 - 51.0 %    MCV 93.8  80.0 - 100.0 fL    MCH 30.7  26.0 - 34.0 pg    MCHC 32.8  32.0 - 36.0 g/dL    RDW 12.2  11.5 - 13.0 %    Platelets 201  150 - 450 10(3)/uL    MPV 9.2  6.5 - 10.0 fL    % Neutrophils 72.0  35.0 - 77.0 %    % Lymphocytes 18.9 (*) 24.0 - 44.0 %    % Monocytes 8.2 (*) 3.0 - 6.0 %    % Eosinophils 0.6  0.0 - 3.0 %    % Basophils 0.2  0.0 - 1.0 %    % Immature Granulocytes 0.1  0.0 - 1.1 %    Abs Neutrophils 6.3  1.5 - 8.5 10(3)/uL    Abs Lymphocytes 1.7  1.1 - 5.0 10(3)/uL    Abs Monocytes 0.7  0.1 - 0.7 10(3)/uL    Abs Eosinophils 0.1  0.0 - 0.3 10(3)/uL    Abs Basophils 0.0  0.0 - 0.1 10(3)/uL    Abs Immature Granulocytes 0.01  0.00 -  0.14 10(3)/uL   COMPREHENSIVE METABOLIC PANEL - Abnormal    Sodium 142  135 - 145 mmol/L    Potassium 4.1  3.5 - 5.0 mmol/L    Chloride 107  98 - 110 mmol/L    CO2 25  22 - 32 mmol/L    Anion Gap 14.1  10.0 - 20.0 mmol/L    Creatinine 0.90  0.52 - 1.04 mg/dL    Blood Urea Nitrogen 26.0 (*) 7.0 - 21.0 mg/dL    BUN/Creatinine Ratio 28.89 (*) 10.00 - 20.00 ratio    Calcium 8.1 (*) 8.4 - 10.3 mg/dL    Glucose 69 (*) 70 - 114 mg/dL    eGFR >60  >=60 mL/min/1.73m(2)    Total Protein 6.2 (*) 6.3 - 8.5 g/dL    Albumin 3.1 (*) 3.5 - 5.1 g/dL    Globulin 3.1  2.4 - 3.5 g/dL    Albumin/Globulin Ratio 1.0 (*) 1.2 - 2.2    Aspartate Aminotransferase (AST) 162 (*) 14 - 36 U/L    Alanine Aminotransferase (ALT) 213 (*) 0 - 35 U/L    Alkaline Phosphatase 133 (*) 38 - 126 U/L    Bilirubin, Total 1.0  0.2 - 1.3 mg/dL    eCrCl (Rx) - Adults 51.6  mL/min   C-REACTIVE PROTEIN, NON-SENSITIVE - Abnormal    C-Reactive Protein 3.60 (*) <=1.00 mg/dL   PROTHROMBIN TIME AND INR - Abnormal    Prothrombin Time 15.6 (*) 9.3 - 11.4 sec    INR 1.6 (*) 0.9 - 1.2 INR   COMPLETE BLOOD COUNT AND DIFFERENTIAL - Abnormal    WBC Count 8.9  4.5 - 11.0 10(3)/uL    RBC Count 3.49 (*) 4.00 - 5.20 10(6)/uL    Hemoglobin 10.8 (*) 12.0 - 16.0 g/dL    Hematocrit 33.3  33.0 - 51.0 %    MCV 95.4  80.0 - 100.0 fL    MCH 30.9  26.0 - 34.0 pg    MCHC 32.4  32.0 - 36.0 g/dL    RDW 12.3  11.5 - 13.0 %    Platelets 189  150 - 450 10(3)/uL    MPV 9.6  6.5 - 10.0 fL    % Neutrophils 71.4  35.0 - 77.0 %    % Lymphocytes 21.4 (*) 24.0 - 44.0 %    % Monocytes 6.5 (*) 3.0 - 6.0 %    % Eosinophils 0.4  0.0 - 3.0 %    % Basophils 0.2  0.0 - 1.0 %    % Immature Granulocytes 0.1  0.0 - 1.1 %    Abs Neutrophils 6.4  1.5 - 8.5 10(3)/uL    Abs Lymphocytes 1.9  1.1 - 5.0 10(3)/uL    Abs Monocytes 0.6  0.1 - 0.7 10(3)/uL    Abs Eosinophils 0.0  0.0 - 0.3 10(3)/uL    Abs Basophils 0.0  0.0 - 0.1 10(3)/uL    Abs Immature Granulocytes 0.01  0.00 - 0.14 10(3)/uL   LIPASE - Abnormal     Lipase <10 (*) 23 - 300 U/L   GLUCOSE, BLOOD, POC - Abnormal    Glucose by Meter 63 (*) 70 - 114 mg/dL   URINALYSIS WITH MICROSCOPIC - Abnormal    Color, Urine Yellow  Yellow    Clarity, Urine Clear  Clear    Specific Gravity, Urine 1.020  1.005 - 1.030    Glucose, Urine Negative  Negative    Bilirubin, Urine Negative  Negative    Ketone, Urine Trace (*) Negative    Blood, Urine Trace (*) Negative    pH, Urine 7.5  5.0 - 8.5 pH    Protein, Urine Negative  Negative    Urobilinogen, Urine 0.2  <2.0 EU/dL    Nitrite, Urine Negative  Negative    Leukocyte Esterase, Urine Negative  Negative    RBC, Urine 5-10 (*) None Seen, 1-2 /HPF    WBC, Urine None Seen  None Seen, 1-5 /HPF    Bacteria, Urine Few  None Seen, Few /HPF    Squamous Epithelial Cells,  Urine None Seen  None Seen, 1-5 /HPF    Hyaline Casts, Urine 1-5  None Seen, 1-5 /LPF    Mucus, Urine Few (*) None Seen /HPF   C-REACTIVE PROTEIN, NON-SENSITIVE - Normal    C-Reactive Protein 1.00  <=1.00 mg/dL   LACTIC ACID - Normal    Lactate (Lactic Acid) 1.2  0.7 - 2.1 mmol/L   LIPASE - Normal    Lipase 68  23 - 300 U/L   TROPONIN I - Normal    Troponin I 0.01  0.00 - 0.05 ng/mL   COMPLETE BLOOD COUNT AND DIFFERENTIAL    Narrative:     The following orders were created for panel order COMPLETE BLOOD COUNT AND DIFFERENTIAL.  Procedure                               Abnormality         Status                     ---------                               -----------         ------                     COMPLETE BLOOD COUNT AND...[[id number]]  Abnormal            Final result                 Please view results for these tests on the individual orders.   COMPLETE BLOOD COUNT AND DIFFERENTIAL    Narrative:     The following orders were created for panel order COMPLETE BLOOD COUNT AND DIFFERENTIAL.  Procedure                               Abnormality         Status                     ---------                               -----------         ------                     COMPLETE  BLOOD COUNT AND...[[id number]]  Abnormal            Final result                 Please view results for these tests on the individual orders.   COMPLETE BLOOD COUNT AND DIFFERENTIAL    Narrative:     The following orders were created for panel order COMPLETE BLOOD COUNT AND DIFFERENTIAL.  Procedure                               Abnormality         Status                     ---------                               -----------         ------                     COMPLETE BLOOD COUNT AND...[[id number]]  Abnormal            Final result                 Please view results for these tests on the individual orders.   GLUCOSE, BLOOD, POC    Glucose by Meter 84  70 - 114 mg/dL                             Course:        Patient was seen and examined shortly after arrival.  Stable.  There are 4 mg IV Zofran, 4 mg IV morphine, started on IV fluid.  Lab reviewed.  Normal white count.  Normal CRP, normal lactic acid, normal troponin.  Chemistry unremarkable.  ALT is barely above the normal 37.  Normal bilirubin.  Slightly elevated D-dimer.  I did order CTA of the chest and CT abdomen pelvis.  Negative for pulmonary thromboembolism.  No significant acute abnormalities found.  This could chest wall intercostal muscle pain given her history, and exam with tenderness on palpation of the chest wall with reproducible pain however given her history of chronic recurrent idiopathic pancreatitis this will be a pancreatitis flareup.  Patient will be admitted to the hospital for IV fluids, pain control, antiemetic as needed, repeat lab in the morning and close monitoring.  Patient agrees with the plan.  Stable for admission.    Diagnosis:  Final diagnoses:   [R10.9] Abdominal pain, unspecified abdominal location (Primary)     Plan:  Decision to Admit Time     Date/Time Event User Comments    06/13/23 8351 ADMIT DISPO [facility]ED [facility] Disposition set to Observation           Follow-up with:  Maia Christy MD  [address] [place] [address]  [facility] [address]  [phone]      Electronically signed by Patsy Mcgarry MD at 06/17/2023  1:10 PM CDT

## 2023-07-10 NOTE — ANESTHESIA PROCEDURE NOTES
Airway       Patient location during procedure: OR       Procedure Start/Stop Times: 7/10/2023 2:04 PM  Staff -        Anesthesiologist:  Arturo Valdez MD       CRNA: Jeanette Jones APRN CRNA       Other Anesthesia Staff: Mei Ba       Performed By: SRNAIndications and Patient Condition       Indications for airway management: barry-procedural       Induction type:intravenous       Mask difficulty assessment: 0 - not attempted    Final Airway Details       Final airway type: endotracheal airway       Successful airway: ETT - single and Oral  Endotracheal Airway Details        ETT size (mm): 7.0       Cuffed: yes       Successful intubation technique: direct laryngoscopy       DL Blade Type: Ramirez 2       Grade View of Cords: 2       Adjucts: stylet       Position: Right       Measured from: gums/teeth       Secured at (cm): 21       Bite block used: None    Post intubation assessment        Placement verified by: capnometry and chest rise        Number of attempts at approach: 1       Number of other approaches attempted: 0       Secured with: pink tape       Ease of procedure: easy       Dentition: Intact and Unchanged    Medication(s) Administered   Medication Administration Time: 7/10/2023 2:04 PM

## 2023-07-10 NOTE — OR NURSING
After 2 doses of fent pt sleeping and spo2 to 72% on [place]. N/c at 3 lpm brings it to 99%. When pt awake without o2 spo2 is 93-94.

## 2023-07-10 NOTE — OR NURSING
Per Ahsan, advised pt the pain was from the pancreatitis. Advised to try some pain meds and let him know if it helped the pain or not. Might admit.

## 2023-07-10 NOTE — H&P
[facility]    History and Physical  Hospitalist       Date of Admission:  7/10/2023  Date of Service (when I saw the patient): 07/10/23    Assessment & Plan   Thais Vigil is a 82 year old female with a past medical history of chronic recurrent pancreatitis, cholelithiasis s/p cholecystectomy, diabetes, fatty liver, hypertension, hyperlipidemia, GERD, and uncomplicated asthma who presented to [facility] on 7/10/2023 to undergo a scheduled ERCP. she had been in the ER recently in June due to abdominal pain, and was found to have evidence of findings consistent with chronic recurrent idiopathic pancreatitis with flareup.  She underwent MRCP and CT scan that showed biliary dilatation.  EUS was performed on 7/10/2023 that showed dilatation in the common bile duct measuring up to 10 mm, with sonographic changes indicative of severe chronic pancreatitis in the entire pancreas.  Pancreatic duct had duct dilatation, hyperechoic walls, visible side branches and tortuous/cachectic appearance.  Pancreatic duct measured up to 7 mm in diameter.  Plan was to proceed with ERCP, and this was performed under MAC anesthesia, no immediate complications.  Biliary sphincterotomy was performed, and 1 temporary stent was placed into the common bile duct.  A pancreatic sphincterectomy was performed and 1 temporary stent was placed in the ventral pancreatic duct.    Chronic pancreatitis S/p ERCP with CBD and pancreatic duct stent placements.  Hx cholecystectomy  Other than pain, doing well postprocedure.  Vital signs stable.  Admitted to observation.  -- Appreciate [facility] GI continuing to follow.  --Pain control: Tylenol as needed, oxycodone 5 mg every 4 hours as needed, Dilaudid IV 0.2 mg every 2 hours as needed.  Can adjust as necessary.  -- Continue PTA Pepcid  -- Continue PTA Creon supplement  -- Clear liquid diet ordered, with plan for transition to low-fat diet at discharge.  -- Repeat CMP,  "lipase and CBC in a.m.    Type 2 diabetes  -- Hold PTA metformin.  No recent A1c on file.  Can remain on clear liquid diet today.  Advance as tolerated and follow-up with PCP for chronic management.    Hypertension  Hyperlipidemia  --Resume PTA lisinopril 10 mg daily with hold parameters  --Hold Lasix 20 mg daily, resume pending p.o. intake  --BMP in a.m., monitor BP  --Can resume fish oil at discharge.  Does not tolerate statins (myalgias).      Diet: Clear Liquid Diet    DVT Prophylaxis: Low Risk/Ambulatory with no VTE prophylaxis indicated  Palma Catheter: Not present  Lines: None     Cardiac Monitoring: None  Code Status:  Full code    Disposition Plan      Expected Discharge Date: 07/12/2023      Destination: home with family       Observation status, anticipate discharge tomorrow 7/11 pending further symptomatic improvement and input from gastroenterology team.      Clinically Significant Risk Factors Present on Admission                  # Hypertension: Home medication list includes antihypertensive(s)      # Overweight: Estimated body mass index is 25.2 kg/m  as calculated from the following:    Height as of this encounter: 1.588 m (5' 2.5\").    Weight as of this encounter: 63.5 kg (140 lb).             The patient's care was discussed with the Bedside Nurse and Patient.    The patient has been discussed with Dr. Pineda, who agrees with the assessment and plan at this time.     WEI Dominguez  Hospitalist Service  [facility]  Securely message with [employer] (more info)  Text page via [facility] Paging/Directory     WEI Dominguez    Primary Care Physician   * Maia Christy    Chief Complaint   Abdominal pain status post ERCP    History is obtained from the patient and review of EMR.    History of Present Illness   Thais Vigil is a very pleasant 82 year old female with a past medical history of chronic recurrent pancreatitis, cholelithiasis s/p cholecystectomy, " diabetes, fatty liver, hypertension, hyperlipidemia, GERD, and uncomplicated asthma who presented to [facility] on 7/10/2023 to undergo a scheduled ERCP. she had been in the ER recently in June due to abdominal pain, and was found to have evidence of findings consistent with chronic recurrent idiopathic pancreatitis with flareup.      She underwent MRCP and CT scan that showed biliary dilatation.  EUS was performed on 7/10/2023 that showed dilatation in the common bile duct measuring up to 10 mm, with sonographic changes indicative of severe chronic pancreatitis in the entire pancreas.  Pancreatic duct had duct dilatation, hyperechoic walls, visible side branches and tortuous/cachectic appearance.  Pancreatic duct measured up to 7 mm in diameter.  Plan was to proceed with ERCP, and this was performed under MAC anesthesia, no immediate complications.  Biliary sphincterotomy was performed, and 1 temporary stent was placed into the common bile duct.  A pancreatic sphincterectomy was performed and 1 temporary stent was placed in the ventral pancreatic duct.    In the same-day PACU, patient reports significant pain.  Dr. Body of gastroenterology has seen the patient, and recommends admission overnight for symptom management.  Patient states that her abdominal pain is somewhat improved, but still quite significant and preventing her from being able to discharge safely.  She denies any nausea or vomiting.  No bowel movement yet.  Denies any chest pain, shortness of breath, fever, chills.  No focal weakness or headache.    Past Medical History    I have reviewed this patient's medical history and updated it with pertinent information if needed.   Past Medical History:   Diagnosis Date     Abdominal pain, generalized      Anemia      Calculus of pancreatic duct      Calculus of pancreatic duct      Cholelithiasis and acute cholecystitis without obstruction      Chronic recurrent pancreatitis (H)       Diabetes (H)      Dilated pancreatic duct      Elevated LFTs      Exocrine pancreatic insufficiency      Fatty liver      Gastroesophageal reflux disease with esophagitis      Hypertension      Mixed hyperlipidemia      Other constipation      Pancreatic duct stricture      Rheumatic fever      Seasonal allergic rhinitis      Suspected 2019 novel coronavirus infection      Uncomplicated asthma        Past Surgical History   I have reviewed this patient's surgical history and updated it with pertinent information if needed.  Past Surgical History:   Procedure Laterality Date     APPENDECTOMY       BACK SURGERY      lumbar discetomy     BIOPSY Left     breast biopsy     CHOLECYSTECTOMY       ENDOSCOPIC RETROGRADE CHOLANGIOPANCREATOGRAPHY       ENT SURGERY      tonsillectomy and adenoidectomy     EYE SURGERY      cataract surgery     GI SURGERY      EGD     ORTHOPEDIC SURGERY      shoulder arthroscopy, bicep tenodesis       Social History   I have reviewed this patient's social history and updated it with pertinent information if needed.     Social History     Tobacco Use     Smoking status: Never     Smokeless tobacco: Never   Vaping Use     Vaping Use: Never used   Substance Use Topics     Alcohol use: Not Currently     Drug use: Never       Medications   Prior to Admission Medications   Prescriptions Last Dose Informant Patient Reported? Taking?   Cholecalciferol (VITAMIN D-3) 25 MCG (1000 UT) CAPS 7/9/2023  Yes Yes   Sig: Take 2,000 Units by mouth daily   Multiple Vitamin (MULTI-VITAMIN DAILY PO) 7/9/2023  Yes Yes   Sig: Take 1 tablet by mouth daily   acetaminophen (TYLENOL) 500 MG tablet 7/9/2023  Yes Yes   Sig: Take 1,000 mg by mouth every 6 hours as needed for mild pain   albuterol (PROAIR HFA/PROVENTIL HFA/VENTOLIN HFA) 108 (90 BASE) MCG/ACT Inhaler More than a month  Yes Yes   Sig: Inhale 1-2 puffs into the lungs every 4 hours as needed for shortness of breath or wheezing   cetirizine (ZYRTEC) 10 MG tablet  7/9/2023  Yes Yes   Sig: Take 10 mg by mouth daily as needed for allergies   cyanocobalamin (VITAMIN B-12) 1000 MCG tablet Past Week  Yes Yes   Sig: Take 1,000 mcg by mouth once a week   famotidine (PEPCID) 40 MG tablet 7/9/2023  Yes Yes   Sig: Take 20 mg by mouth 2 times daily   fish oil-omega-3 fatty acids 1000 MG capsule 7/9/2023  Yes Yes   Sig: Take 2 g by mouth daily   fluticasone-salmeterol (ADVAIR HFA) 115-21 MCG/ACT inhaler 7/9/2023  Yes Yes   Sig: Inhale 2 puffs into the lungs 2 times daily   furosemide (LASIX) 20 MG tablet 7/9/2023  Yes Yes   Sig: Take 20 mg by mouth daily   gabapentin (NEURONTIN) 100 MG capsule Past Month  Yes Yes   Sig: Take 100 mg by mouth nightly as needed   ipratropium - albuterol 0.5 mg/2.5 mg/3 mL (DUONEB) 0.5-2.5 (3) MG/3ML neb solution PRN  Yes Yes   Sig: Take 1 vial by nebulization 4 times daily as needed for shortness of breath, wheezing or cough   lipase-protease-amylase (CREON 24) 08184-60506-326400 units CPEP per EC capsule 7/9/2023 at 1800  Yes Yes   Sig: Take 2 capsules by mouth 3 times daily (with meals)   lisinopril (ZESTRIL) 10 MG tablet 7/10/2023  Yes Yes   Sig: Take 10 mg by mouth daily      Facility-Administered Medications: None     Allergies   Allergies   Allergen Reactions     Atorvastatin      Muscle pain     Omeprazole      Muscle pain     Pantoprazole      Muscle pain     Ranitidine      Body aches     Statins Muscle Pain (Myalgia)     Bactrim [Sulfamethoxazole-Trimethoprim] Rash       Review of Systems   The 10 point Review of Systems is negative other than noted in the HPI.    Physical Exam   Temp: 98.1  F (36.7  C) Temp src: Temporal BP: 122/57 Pulse: 73   Resp: 14 SpO2: 97 % O2 Device: None (Room air) Oxygen Delivery: 1 LPM  Vital Signs with Ranges  Temp:  [97.4  F (36.3  C)-98.1  F (36.7  C)] 98.1  F (36.7  C)  Pulse:  [68-96] 73  Resp:  [13-25] 14  BP: ()/(51-63) 122/57  SpO2:  [95 %-100 %] 97 %  140 lbs 0 oz    Constitutional: Adult female, lying on  the gurney, awake, alert, cooperative, no apparent distress.    ENT: Normocephalic, without obvious abnormality, atraumatic, oropharynx with MMM.  Pupils equal and round.  Sclera nonicteric.    Neck: Supple  Pulmonary: No increased work of breathing, good air exchange, clear to auscultation bilaterally, no crackles or wheezing.  Cardiovascular: Regular rate and rhythm, normal S1 and S2, no murmur.  GI: Bowel sounds positive.  Abdomen soft, nondistended, TTP in RUQ.  No rebound or guarding.  Skin/Integumen: Clear.  Neuro: CN II-XII grossly intact.  Moves all 4 extremities equally.  Speech intact.  No facial droop.  Psych:  Alert and oriented to self, place, date, situation.  Normal mood and affect.  Extremities: No lower extremity edema noted.    Data   Data reviewed today:  I personally reviewed all labs, imaging reports, and procedure reports from this encounter.  No lab results found in last 7 days.    Results for orders placed or performed during the hospital encounter of 07/10/23 (from the past 24 hour(s))   ENDOSCOPIC RETROGRADE CHOLANGIOPANCREATOGRAPHY   Result Value Ref Range    ERCP       [facility]  [address] [place]  [address]  _______________________________________________________________________________  Patient Name: Thais Vigil               Procedure Date: 7/10/2023 12:44 PM  MRN: [id number]                       Account Number: [id number]  YOB: 1940              Admit Type: Outpatient  Age: 82                               Room: [place] [address]  Note Status: Finalized                Attending MD: STACY BLOUNT MD,   Instrument Name: 503  Q190V ERCP        _______________________________________________________________________________     Procedure:                ERCP  Indications:              Abnormal endoscopic ultrasound of the biliary                             system, Acute recurrent pancreatitis, Chronic                              pancreatitis  Providers:                STACY BLOUNT MD, Yanira Nicole, WHIT, Elham Galindo RN  Referring MD:               Medicines:                G eneral Anesthesia  Complications:            No immediate complications.  _______________________________________________________________________________  Procedure:                Pre-Anesthesia Assessment:                            - Prior to the procedure, a History and Physical                             was performed, and patient medications and                             allergies were reviewed. The patient is competent.                             The risks and benefits of the procedure and the                             sedation options and risks were discussed with the                             patient. All questions were answered and informed                             consent was obtained. Patient identification and                             proposed procedure were verified by the physician.                             Mental Status Examination: normal. Respiratory                             Examination: clear to auscultation. Prophylactic                              Antibiotics: The patient does not require                             prophylactic antibiotics. Prior Anticoagulants: The                             patient has taken no anticoagulant or antiplatelet                             agents. After reviewing the risks and benefits, the                             patient was deemed in satisfactory condition to                             undergo the procedure. The anesthesia plan was to                             use minimal sedation / analgesia (anxiolysis).                             Immediately prior to administration of medications,                             the patient was re-assessed for adequacy to receive                             sedatives. The heart rate, respiratory rate, oxygen                              saturations, blood pressure, adequacy of pulmonary                             ventilation, and response to care were monitored                             throughout the procedure. The physical status of                              the patient was re-assessed after the procedure.                            After obtaining informed consent, the scope was                             passed under direct vision. Throughout the                             procedure, the patient's blood pressure, pulse, and                             oxygen saturations were monitored continuously. The                             Duodenoscope was introduced through the mouth, and                             used to inject contrast into and used to inject                             contrast into the bile, dorsal and ventral                             pancreatic ducts. The ERCP was accomplished without                             difficulty. The patient tolerated the procedure                             well.                                                                                   Findings:       The  film was normal. The esophagus was successfully intubated        under direct vision without detailed exami nation of the pharynx, larynx,        and associated structures, and upper GI tract. The upper GI tract was        grossly normal. A biliary sphincterotomy had been performed. The        sphincterotomy appeared open. The upper GI tract was traversed under        direct vision without detailed examination. Diffuse moderately congested        mucosa without active bleeding and with no stigmata of bleeding was        found in the duodenal bulb and in the first portion of the duodenum. The        bile duct was deeply cannulated with the short-nosed traction        sphincterotome. Contrast was injected. I personally interpreted the bile        duct images. There was brisk flow of contrast through the  ducts. Image        quality was excellent. Image quality was adequate. Contrast extended to        the biliary pancreatic junction. Opacification of the main bile duct was        successful. The maximum diameter of the ducts was 10 mm. A short 0.035        inch Soft Jagwire was passed into the  biliary tree. An 8 mm biliary        sphincterotomy was made with a monofilament traction (standard)        sphincterotome using ERBE electrocautery. There was no        post-sphincterotomy bleeding. One 10 Fr by 7 cm temporary stent with a        single external flap and a single internal flap was placed 6 cm into the        common bile duct. Bile flowed through the stent. The stent was in good        position. The ventral pancreatic duct was deeply cannulated with the        short-nosed traction sphincterotome. Contrast was injected.        Opacification of the entire pancreatic ductal system was successful. The        maximum diameter of the ducts was 7 mm. The ventral pancreatic duct in        the head of the pancreas, pancreatic duct in the genu of the pancreas        and pancreatic duct in the body of the pancreas were dilated moderately        and markedly. Greater than 3 side branches were abnormal. They were        irregular. The ventral pancreatic duct in the head of the panc reas        contained a moderate stenosis one mm in length. A short 0.035 inch Soft        Jagwire was passed into the ventral pancreatic duct. A 3 mm ventral        pancreatic sphincterotomy was made with a monofilament traction        (standard) sphincterotome using ERBE electrocautery. There was no        post-sphincterotomy bleeding. One 5 Fr by 7 cm temporary stent with a        single external flap and a single internal flap was placed 6 cm into the        ventral pancreatic duct. Clear fluid flowed through the stent. The stent        was in good position.                                                                                    Impression:               - Prior biliary sphincterotomy appeared open.                            - Congested duodenal mucosa.                            - Moderate and marked dilatation of the ventral                             pancreatic duct in the head of the pancreas,                             pancreatic duct in the body of the pancreas a nd the                             pancreatic duct in the genu of the pancreas was                             found.                            - A pancreatic duct stricture was found.                            - Pancreatic duct side branches were abnormal. They                             appeared irregular.                            - A biliary sphincterotomy was performed.                            - One temporary stent was placed into the common                             bile duct.                            - A pancreatic sphincterotomy was performed.                            - One temporary stent was placed into the ventral                             pancreatic duct.  Recommendation:           - Patient has a contact number available for                             emergencies. The signs and symptoms of potential                             delayed complications were discussed with the                             patient. Return to normal activities tomorrow.                              Written discharge instructions were provided to the                             patient.                            - Clear liquid diet.                            - Repeat ERCP in 6 weeks to remove stent.                                                                                   Procedure Code(s):       --- Professional ---       82584, Endoscopic retrograde cholangiopancreatography (ERCP); with        placement of endoscopic stent into biliary or pancreatic duct, including        pre- and post-dilation and guide wire passage, when performed, including         sphincterotomy, when performed, each stent       21052, 59, Endoscopic retrograde cholangiopancreatography (ERCP); with        placement of endoscopic stent into biliary or pancreatic duct, including        pre- and post-dilation and guide wire passage, when performed, including        sphincterotomy, when performed, each stent  Diagnosis Code(s):       --- Professional ---       K31.89, Other diseases of  stomach and duodenum       K86.89, Other specified diseases of pancreas       K85.90, Acute pancreatitis without necrosis or infection, unspecified       K86.1, Other chronic pancreatitis       R93.2, Abnormal findings on diagnostic imaging of liver and biliary tract    CPT copyright 2021 [employer]. All rights reserved.    The codes documented in this report are preliminary and upon  review may   be revised to meet current compliance requirements.    Electronically Signed by Poli Holm  ________________________  POLI BLOUNT MD  7/10/2023 3:06:05 PM  I was physically present for the entire viewing portion of the exam.  POLI BLOUNT MD  Number of Addenda: 0    Note Initiated On: 7/10/2023 12:44 PM  Total Procedure Duration: 0 hours 13 minutes 8 seconds   Scope In: 2:27:55 PM  Scope Out: 2:41:03 PM     UPPER EUS   Result Value Ref Range    Upper EUS       [place] [address] [place]  [address]  _______________________________________________________________________________  Patient Name: Thais Vigil               Procedure Date: 7/10/2023 12:49 PM  MRN: [id number]                       Account Number: [id number]  YOB: 1940              Admit Type: Outpatient  Age: 82                               Room: [place] [address]  Note Status: Finalized                Attending MD: POLI BLOUNT MD,   Instrument Name: [id number] Linear   _______________________________________________________________________________     Procedure:                 Upper EUS  Indications:              Common bile duct dilation (acquired) seen on CT                             scan, Dilated pancreatic duct on CT scan, Chronic                             pancreatitis, Acute recurrent pancreatitis  Providers:                STACY BLOUNT MD, Elham Galindo, WHIT, Yanira Nicole RN  Referring MD:                Medicines:                General Anesthesia  Complications:            No immediate complications.  _______________________________________________________________________________  Procedure:                Pre-Anesthesia Assessment:                            - Prior to the procedure, a History and Physical                             was performed, and patient medications and                             allergies were reviewed. The patient is competent.                             The risks and benefits of the procedure and the                             sedation options and risks were discussed with the                             patient. All questions were answered and informed                             consent was obtained. Patient identification and                             proposed procedure were verified by the physician.                             Mental Status Examination: normal. Respiratory                             Examination: clear to auscultatio n. Prophylactic                             Antibiotics: The patient does not require                             prophylactic antibiotics. Prior Anticoagulants: The                             patient has taken no anticoagulant or antiplatelet                             agents. After reviewing the risks and benefits, the                             patient was deemed in satisfactory condition to                             undergo the procedure. The anesthesia plan was to                             use minimal sedation / analgesia (anxiolysis).                              Immediately prior to administration of medications,                             the patient was re-assessed for adequacy to receive                             sedatives. The heart rate, respiratory rate, oxygen                             saturations, blood pressure, adequacy of pulmonary                             ventilation, and response to care were monitored                             throughout the procedu re. The physical status of                             the patient was re-assessed after the procedure.                            After obtaining informed consent, the endoscope was                             passed under direct vision. Throughout the                             procedure, the patient's blood pressure, pulse, and                             oxygen saturations were monitored continuously. The                             Endosonoscope was introduced through the mouth, and                             advanced to the second part of duodenum. The upper                             EUS was accomplished without difficulty. The                             patient tolerated the procedure well.                                                                                   Findings:       ENDOSONOGRAPHIC FINDING: :       There was no sign of significant endosonographic abnormality in the        esophagus.       There was no sign of significant endosonographic abnormality in t he        examined duodenum.       Endosonographic images of the stomach were unremarkable.       There was no sign of significant endosonographic abnormality in the        ampulla.       There was dilation in the common bile duct which measured up to 10 mm.       Endosonographic imaging of the pancreas showed sonographic changes        indicative of severe chronic pancreatitis in the entire pancreas. The        parenchyma had atrophy, calcifications, hyperechoic foci, hypoechoic        foci and lobularity. The pancreatic  duct had duct dilation, hyperechoic        walls, visible side-branches and a tortuous/ectatic appearance. The        pancreatic duct measured up to 7 mm in diameter.       No lymphadenopathy seen.                                                                                   Impression:               - There was no sign of significant pathology in the                             esophagus.                            - There was no sign of significant pathology in  the                             examined duodenum.                            - Endosonographic images of the stomach were                             unremarkable.                            - There was no sign of significant pathology in the                             ampulla.                            - There was dilation in the common bile duct which                             measured up to 10 mm.                            - Endosonographic imaging of the pancreas showed                             sonographic changes consistent with severe chronic                             pancreatitis.                            - No specimens collected.  Recommendation:           - Patient has a contact number available for                             emergencies. The signs and symptoms of potential                             delayed complications were discussed with the                             patient. Return to normal activities tomorrow.                             Written dischar ge instructions were provided to the                             patient.                            - Low fat diet.                                                                                   Procedure Code(s):       --- Professional ---       20421, Esophagogastroduodenoscopy, flexible, transoral; with endoscopic        ultrasound examination, including the esophagus, stomach, and either the        duodenum or a surgically altered stomach where the jejunum is  examined        distal to the anastomosis  Diagnosis Code(s):       --- Professional ---       K86.1, Other chronic pancreatitis       K83.8, Other specified diseases of biliary tract       K86.89, Other specified diseases of pancreas       K85.90, Acute pancreatitis without necrosis or infection, unspecified    CPT copyright 2021 [employer]. All rights reserved.    The codes documented in this report are preliminary and upon  review may   be revised to meet current compliance requirements.    Electr onically Signed by Poli Holm  ________________________  POLI BLOUNT MD  7/10/2023 3:00:33 PM  I was physically present for the entire viewing portion of the exam.  POLI BLOUNT MD  Number of Addenda: 0    Note Initiated On: 7/10/2023 12:49 PM  Total Procedure Duration: 0 hours 3 minutes 44 seconds   Scope In: 2:21:14 PM  Scope Out: 2:24:58 PM     XR ERCP    Narrative    This exam was marked as non-reportable because it will not be read by a   radiologist or a [place] non-radiologist provider.                 Imaging results reviewed over the past 24 hrs:   Recent Results (from the past 24 hour(s))   XR ERCP    Narrative    This exam was marked as non-reportable because it will not be read by a   radiologist or a [place] non-radiologist provider.           No lab results found in last 7 days.

## 2023-07-10 NOTE — ANESTHESIA CARE TRANSFER NOTE
Patient: Thais Vigil    Procedure: Procedure(s):  Endoscopic retrograde cholangiopancreatogram, stent placement, endoscopic ultrasound       Diagnosis: Chronic pancreatitis (H) [K86.1]  Biliary sludge [K83.8]  Diagnosis Additional Information: No value filed.    Anesthesia Type:   General     Note:    Oropharynx: oropharynx clear of all foreign objects and spontaneously breathing  Level of Consciousness: awake  Oxygen Supplementation: face mask  Level of Supplemental Oxygen (L/min / FiO2): 6  Independent Airway: airway patency satisfactory and stable  Dentition: dentition unchanged  Vital Signs Stable: post-procedure vital signs reviewed and stable  Report to RN Given: handoff report given  Patient transferred to: PACU  Comments: Pt to PACU with O2 via mask, airway patent, VSS. Report to RN.  Handoff Report: Identifed the Patient, Identified the Reponsible Provider, Reviewed the pertinent medical history, Discussed the surgical course, Reviewed Intra-OP anesthesia mangement and issues during anesthesia, Set expectations for post-procedure period and Allowed opportunity for questions and acknowledgement of understanding      Vitals:  Vitals Value Taken Time   /57 07/10/23 1458   Temp     Pulse 78 07/10/23 1500   Resp 20 07/10/23 1500   SpO2 98 % 07/10/23 1500   Vitals shown include unvalidated device data.    Electronically Signed By: GEORGE See CRNA  July 10, 2023  3:01 PM

## 2023-07-11 VITALS
DIASTOLIC BLOOD PRESSURE: 52 MMHG | SYSTOLIC BLOOD PRESSURE: 109 MMHG | HEART RATE: 61 BPM | HEIGHT: 63 IN | TEMPERATURE: 97.8 F | WEIGHT: 140 LBS | OXYGEN SATURATION: 97 % | RESPIRATION RATE: 18 BRPM | BODY MASS INDEX: 24.8 KG/M2

## 2023-07-11 LAB
ALBUMIN SERPL BCG-MCNC: 3.5 G/DL (ref 3.5–5.2)
ALP SERPL-CCNC: 72 U/L (ref 35–104)
ALT SERPL W P-5'-P-CCNC: 14 U/L (ref 0–50)
ANION GAP SERPL CALCULATED.3IONS-SCNC: 8 MMOL/L (ref 7–15)
AST SERPL W P-5'-P-CCNC: 16 U/L (ref 0–45)
BILIRUB SERPL-MCNC: 0.9 MG/DL
BUN SERPL-MCNC: 24 MG/DL (ref 8–23)
CALCIUM SERPL-MCNC: 9.2 MG/DL (ref 8.8–10.2)
CHLORIDE SERPL-SCNC: 104 MMOL/L (ref 98–107)
CREAT SERPL-MCNC: 0.99 MG/DL (ref 0.51–0.95)
DEPRECATED HCO3 PLAS-SCNC: 29 MMOL/L (ref 22–29)
ERYTHROCYTE [DISTWIDTH] IN BLOOD BY AUTOMATED COUNT: 12.5 % (ref 10–15)
GFR SERPL CREATININE-BSD FRML MDRD: 57 ML/MIN/1.73M2
GLUCOSE SERPL-MCNC: 107 MG/DL (ref 70–99)
HCT VFR BLD AUTO: 33 % (ref 35–47)
HGB BLD-MCNC: 11 G/DL (ref 11.7–15.7)
LIPASE SERPL-CCNC: 6 U/L (ref 13–60)
MCH RBC QN AUTO: 31.5 PG (ref 26.5–33)
MCHC RBC AUTO-ENTMCNC: 33.3 G/DL (ref 31.5–36.5)
MCV RBC AUTO: 95 FL (ref 78–100)
PLATELET # BLD AUTO: 179 10E3/UL (ref 150–450)
POTASSIUM SERPL-SCNC: 5.3 MMOL/L (ref 3.4–5.3)
PROT SERPL-MCNC: 6.3 G/DL (ref 6.4–8.3)
RBC # BLD AUTO: 3.49 10E6/UL (ref 3.8–5.2)
SODIUM SERPL-SCNC: 141 MMOL/L (ref 136–145)
WBC # BLD AUTO: 8.5 10E3/UL (ref 4–11)

## 2023-07-11 PROCEDURE — 83690 ASSAY OF LIPASE: CPT | Performed by: PHYSICIAN ASSISTANT

## 2023-07-11 PROCEDURE — 250N000013 HC RX MED GY IP 250 OP 250 PS 637: Performed by: PHYSICIAN ASSISTANT

## 2023-07-11 PROCEDURE — 96361 HYDRATE IV INFUSION ADD-ON: CPT

## 2023-07-11 PROCEDURE — G0378 HOSPITAL OBSERVATION PER HR: HCPCS

## 2023-07-11 PROCEDURE — 99239 HOSP IP/OBS DSCHRG MGMT >30: CPT | Performed by: STUDENT IN AN ORGANIZED HEALTH CARE EDUCATION/TRAINING PROGRAM

## 2023-07-11 PROCEDURE — 36415 COLL VENOUS BLD VENIPUNCTURE: CPT | Performed by: PHYSICIAN ASSISTANT

## 2023-07-11 PROCEDURE — 258N000003 HC RX IP 258 OP 636: Performed by: INTERNAL MEDICINE

## 2023-07-11 PROCEDURE — 80053 COMPREHEN METABOLIC PANEL: CPT | Performed by: PHYSICIAN ASSISTANT

## 2023-07-11 PROCEDURE — 85027 COMPLETE CBC AUTOMATED: CPT | Performed by: PHYSICIAN ASSISTANT

## 2023-07-11 RX ORDER — OXYCODONE HYDROCHLORIDE 5 MG/1
5 TABLET ORAL EVERY 6 HOURS PRN
Qty: 12 TABLET | Refills: 0 | Status: SHIPPED | OUTPATIENT
Start: 2023-07-11

## 2023-07-11 RX ORDER — FLUTICASONE PROPIONATE AND SALMETEROL XINAFOATE 115; 21 UG/1; UG/1
2 AEROSOL, METERED RESPIRATORY (INHALATION) 2 TIMES DAILY
Status: DISCONTINUED | OUTPATIENT
Start: 2023-07-11 | End: 2023-07-11 | Stop reason: HOSPADM

## 2023-07-11 RX ORDER — FAMOTIDINE 20 MG/1
20 TABLET, FILM COATED ORAL DAILY
Status: DISCONTINUED | OUTPATIENT
Start: 2023-07-12 | End: 2023-07-11 | Stop reason: HOSPADM

## 2023-07-11 RX ADMIN — PANCRELIPASE 2 CAPSULE: 24000; 76000; 120000 CAPSULE, DELAYED RELEASE PELLETS ORAL at 13:03

## 2023-07-11 RX ADMIN — SODIUM CHLORIDE, POTASSIUM CHLORIDE, SODIUM LACTATE AND CALCIUM CHLORIDE: 600; 310; 30; 20 INJECTION, SOLUTION INTRAVENOUS at 06:53

## 2023-07-11 RX ADMIN — PANCRELIPASE 2 CAPSULE: 24000; 76000; 120000 CAPSULE, DELAYED RELEASE PELLETS ORAL at 09:46

## 2023-07-11 RX ADMIN — FAMOTIDINE 20 MG: 20 TABLET ORAL at 09:56

## 2023-07-11 RX ADMIN — FLUTICASONE PROPIONATE AND SALMETEROL XINAFOATE 2 PUFF: 115; 21 AEROSOL, METERED RESPIRATORY (INHALATION) at 10:14

## 2023-07-11 RX ADMIN — LISINOPRIL 10 MG: 10 TABLET ORAL at 08:56

## 2023-07-11 ASSESSMENT — ACTIVITIES OF DAILY LIVING (ADL)
ADLS_ACUITY_SCORE: 31

## 2023-07-11 NOTE — PROGRESS NOTES
Observation goals  PRIOR TO DISCHARGE       Comments: -diagnostic tests and consults completed and resulted MET  -vital signs normal or at patient baseline MET  -tolerating oral intake to maintain hydration MET  -adequate pain control on oral analgesics MET  -returns to baseline functional status MET  Nurse to notify provider when observation goals have been met and patient is ready for discharge.  Dr. Pineda notified that pt is tolerating regular diet, ok'd to discharge pt.

## 2023-07-11 NOTE — PLAN OF CARE
Pt tolerating full liquid diet, per GI note - pt to [facility]. AVS reviewed with pt. Education completed. All questions answered. Discharge med oxy reviewed & given to pt. PIV removed. Pt dressed & all belongings are with pt. Denies pain. Denies N/V. Discharged home with sister & to follow up instructions in AVS.

## 2023-07-11 NOTE — DISCHARGE SUMMARY
"[facility]  Hospitalist Discharge Summary      Date of Admission:  7/10/2023  Date of Discharge:  7/11/2023  Discharging Provider: Tony Pineda MD  Discharge Service: Hospitalist Service    Discharge Diagnoses     Chronic pancreatitis S/p ERCP with CBD and pancreatic duct stent placements.  Hx cholecystectomy    Clinically Significant Risk Factors     # Overweight: Estimated body mass index is 25.2 kg/m  as calculated from the following:    Height as of this encounter: 1.588 m (5' 2.5\").    Weight as of this encounter: 63.5 kg (140 lb).       Follow-ups Needed After Discharge   Follow-up Appointments     Follow-up and recommended labs and tests       Follow up with primary care provider, Maia Christy, within 7 days   for hospital follow- up.  No follow up labs or test are needed.            Unresulted Labs Ordered in the Past 30 Days of this Admission     No orders found for last 31 day(s).        Discharge Disposition   Discharged to home  Condition at discharge: Stable    Hospital Course   Thais Vigil is a 82 year old female with a past medical history of chronic recurrent pancreatitis, cholelithiasis s/p cholecystectomy, diabetes, fatty liver, hypertension, hyperlipidemia, GERD, and uncomplicated asthma who presented to [facility] on 7/10/2023 to undergo a scheduled ERCP. she had been in the ER recently in June due to abdominal pain, and was found to have evidence of findings consistent with chronic recurrent idiopathic pancreatitis with flareup.  She underwent MRCP and CT scan that showed biliary dilatation.  EUS was performed on 7/10/2023 that showed dilatation in the common bile duct measuring up to 10 mm, with sonographic changes indicative of severe chronic pancreatitis in the entire pancreas.  Pancreatic duct had duct dilatation, hyperechoic walls, visible side branches and tortuous/cachectic appearance.  Pancreatic duct measured up to 7 mm in diameter.  Plan " was to proceed with ERCP, and this was performed under MAC anesthesia, no immediate complications.  Biliary sphincterotomy was performed, and 1 temporary stent was placed into the common bile duct.  A pancreatic sphincterectomy was performed and 1 temporary stent was placed in the ventral pancreatic duct.    Chronic pancreatitis S/p ERCP with CBD and pancreatic duct stent placements.  Hx cholecystectomy  Other than pain, doing well postprocedure.  Vital signs stable. Was Admitted to observation.  -- Appreciate [facility] continuing to follow.  -- Pain control: Tylenol as needed, oxycodone sparingly as needed for pain.  -- Continue PTA Pepcid  -- Continue PTA Creon supplement    Type 2 diabetes  -- Resume PTA metformin. Follow-up with PCP for chronic management.    Hypertension  Hyperlipidemia  --Resume PTA lisinopril 10 mg daily   --Resume Lasix 20 mg daily,   --Can resume fish oil at discharge.  Does not tolerate statins (myalgias)    Consultations This Hospital Stay   HOSPITALIST IP CONSULT  CARE MANAGEMENT / SOCIAL WORK IP CONSULT    Code Status   Full Code    Time Spent on this Encounter   I, Tony Pineda MD, personally saw the patient today and spent greater than 30 minutes discharging this patient.       Tony Pineda MD  [facility] EXTENDED RECOVERY AND SHORT STAY  [address] [place] [id number]  Phone: [phone]  ______________________________________________________________________    Physical Exam   Vital Signs: Temp: 97.8  F (36.6  C) Temp src: Oral BP: 109/52 Pulse: 61   Resp: 18 SpO2: 97 % O2 Device: None (Room air) Oxygen Delivery: 1 LPM  Weight: 140 lbs 0 oz  ----------------------------------------------------------------------------------------       Primary Care Physician   Maia Christy    Discharge Orders      Reason for your hospital stay    You had an ERCP and had pain after.     Follow-up and recommended labs and tests     Follow up with primary care  provider, Maia Christy, within 7 days for hospital follow- up.  No follow up labs or test are needed.     Activity    Your activity upon discharge: activity as tolerated     Diet    Follow this diet upon discharge: Orders Placed This Encounter      Full Liquid Diet       Significant Results and Procedures   Most Recent 3 CBC's:  Recent Labs   Lab Test 07/11/23  0632   WBC 8.5   HGB 11.0*   MCV 95        Most Recent 3 BMP's:  Recent Labs   Lab Test 07/11/23  0632      POTASSIUM 5.3   CHLORIDE 104   CO2 29   BUN 24.0*   CR 0.99*   ANIONGAP 8   ANA 9.2   *     Most Recent 2 LFT's:  Recent Labs   Lab Test 07/11/23  0632   AST 16   ALT 14   ALKPHOS 72   BILITOTAL 0.9     Most Recent 3 INR's:No lab results found.  Most Recent 3 Troponin's:No lab results found.  Most Recent 3 BNP's:No lab results found.,   Results for orders placed or performed during the hospital encounter of 07/10/23   XR ERCP    Narrative    This exam was marked as non-reportable because it will not be read by a   radiologist or a [place] non-radiologist provider.               Discharge Medications   Current Discharge Medication List      START taking these medications    Details   oxyCODONE (ROXICODONE) 5 MG tablet Take 1 tablet (5 mg) by mouth every 6 hours as needed for severe pain (IF pain not managed with non-pharmacological and non-opioid interventions)  Qty: 12 tablet, Refills: 0    Associated Diagnoses: S/P ERCP         CONTINUE these medications which have NOT CHANGED    Details   acetaminophen (TYLENOL) 500 MG tablet Take 1,000 mg by mouth every 6 hours as needed for mild pain      albuterol (PROAIR HFA/PROVENTIL HFA/VENTOLIN HFA) 108 (90 BASE) MCG/ACT Inhaler Inhale 1-2 puffs into the lungs every 4 hours as needed for shortness of breath or wheezing      cetirizine (ZYRTEC) 10 MG tablet Take 10 mg by mouth daily as needed for allergies      Cholecalciferol (VITAMIN D-3) 25 MCG (1000 UT) CAPS Take 2,000 Units by  mouth daily      cyanocobalamin (VITAMIN B-12) 1000 MCG tablet Take 1,000 mcg by mouth once a week      famotidine (PEPCID) 40 MG tablet Take 20 mg by mouth 2 times daily      fish oil-omega-3 fatty acids 1000 MG capsule Take 2 g by mouth daily      fluticasone-salmeterol (ADVAIR HFA) 115-21 MCG/ACT inhaler Inhale 2 puffs into the lungs 2 times daily      furosemide (LASIX) 20 MG tablet Take 20 mg by mouth daily      gabapentin (NEURONTIN) 100 MG capsule Take 100 mg by mouth nightly as needed      ipratropium - albuterol 0.5 mg/2.5 mg/3 mL (DUONEB) 0.5-2.5 (3) MG/3ML neb solution Take 1 vial by nebulization 4 times daily as needed for shortness of breath, wheezing or cough      lipase-protease-amylase (CREON 24) 54183-43[address]-662794 units CPEP per EC capsule Take 2 capsules by mouth 3 times daily (with meals)      lisinopril (ZESTRIL) 10 MG tablet Take 10 mg by mouth daily      Multiple Vitamin (MULTI-VITAMIN DAILY PO) Take 1 tablet by mouth daily           Allergies   Allergies   Allergen Reactions     Atorvastatin      Muscle pain     Omeprazole      Muscle pain     Pantoprazole      Muscle pain     Ranitidine      Body aches     Statins Muscle Pain (Myalgia)     Bactrim [Sulfamethoxazole-Trimethoprim] Rash

## 2023-07-11 NOTE — PLAN OF CARE
Orientation/Cognitive: A&O POD0  Observation Goals (Met/ Not Met): not met  Mobility Level/Assist Equipment: SBA gb  Fall Risk (Y/N): Y  Behavior Concerns: N  Pain Management: oxy dilaudid avail  Tele/VS/O2: VSS on RA  ABNL Lab/BG: no  Diet: Clear liq ordered, NPO if in pain  Bowel/Bladder: voiding urine  Skin Concerns: no  Drains/Devices: no  Tests/Procedures for next shift: no  Anticipated DC date & active delays: 7-10  Patient Stated Goal for Today: rest

## 2023-07-11 NOTE — CONSULTS
[facility]  Gastroenterology Consultation         [address]ais [facility]igil  [address]  [facility] [address]  82 year old female    Admission Date/Time: 7/10/2023  Primary Care Provider: Maia Christy      We saw patient yesterday for outpatient ERCP and developed abdominal pain post procedure. Admitted for observation    CC: post procedure abdominal pain    HPI:  Thais Vigil is a 82 year old female who has a past medical history of chronic recurrent pancreatitis, cholelithiasis s/p cholecystectomy, diabetes, fatty liver, hypertension, hyperlipidemia, GERD, and uncomplicated asthma who presented to [facility] on 7/10/2023 to undergo a scheduled ERCP. she had been in the ER recently in June due to abdominal pain, and was found to have evidence of findings consistent with chronic recurrent idiopathic pancreatitis with flareup.      After ERCP developed acute sharp RUQ pain that radiated to back and since has resolved. Denies nausea vomiting, diarrhea or other concerns. Has no concerns at this time.      ROS: A comprehensive ten point review of systems was negative aside from those in mentioned in the HPI.      PAST MED HX:  I have reviewed this patient's medical history and updated it with pertinent information if needed.   Past Medical History:   Diagnosis Date     Abdominal pain, generalized      Anemia      Calculus of pancreatic duct      Calculus of pancreatic duct      Cholelithiasis and acute cholecystitis without obstruction      Chronic recurrent pancreatitis (H)      Diabetes (H)      Dilated pancreatic duct      Elevated LFTs      Exocrine pancreatic insufficiency      Fatty liver      Gastroesophageal reflux disease with esophagitis      Hypertension      Mixed hyperlipidemia      Other constipation      Pancreatic duct stricture      Rheumatic fever      Seasonal allergic rhinitis      Suspected 2019 novel coronavirus infection      Uncomplicated asthma         MEDICATIONS:   Prior to Admission Medications   Prescriptions Last Dose Informant Patient Reported? Taking?   Cholecalciferol (VITAMIN D-3) 25 MCG (1000 UT) CAPS 7/9/2023  Yes Yes   Sig: Take 2,000 Units by mouth daily   Multiple Vitamin (MULTI-VITAMIN DAILY PO) 7/9/2023  Yes Yes   Sig: Take 1 tablet by mouth daily   acetaminophen (TYLENOL) 500 MG tablet 7/9/2023  Yes Yes   Sig: Take 1,000 mg by mouth every 6 hours as needed for mild pain   albuterol (PROAIR HFA/PROVENTIL HFA/VENTOLIN HFA) 108 (90 BASE) MCG/ACT Inhaler More than a month  Yes Yes   Sig: Inhale 1-2 puffs into the lungs every 4 hours as needed for shortness of breath or wheezing   cetirizine (ZYRTEC) 10 MG tablet 7/9/2023  Yes Yes   Sig: Take 10 mg by mouth daily as needed for allergies   cyanocobalamin (VITAMIN B-12) 1000 MCG tablet Past Week  Yes Yes   Sig: Take 1,000 mcg by mouth once a week   famotidine (PEPCID) 40 MG tablet 7/9/2023  Yes Yes   Sig: Take 20 mg by mouth 2 times daily   fish oil-omega-3 fatty acids 1000 MG capsule 7/9/2023  Yes Yes   Sig: Take 2 g by mouth daily   fluticasone-salmeterol (ADVAIR HFA) 115-21 MCG/ACT inhaler 7/9/2023  Yes Yes   Sig: Inhale 2 puffs into the lungs 2 times daily   furosemide (LASIX) 20 MG tablet 7/9/2023  Yes Yes   Sig: Take 20 mg by mouth daily   gabapentin (NEURONTIN) 100 MG capsule Past Month  Yes Yes   Sig: Take 100 mg by mouth nightly as needed   ipratropium - albuterol 0.5 mg/2.5 mg/3 mL (DUONEB) 0.5-2.5 (3) MG/3ML neb solution PRN  Yes Yes   Sig: Take 1 vial by nebulization 4 times daily as needed for shortness of breath, wheezing or cough   lipase-protease-amylase (CREON 24) 22072-83448-434362 units CPEP per EC capsule 7/9/2023 at 1800  Yes Yes   Sig: Take 2 capsules by mouth 3 times daily (with meals)   lisinopril (ZESTRIL) 10 MG tablet 7/10/2023  Yes Yes   Sig: Take 10 mg by mouth daily      Facility-Administered Medications: None       ALLERGIES:   Allergies   Allergen Reactions      Atorvastatin      Muscle pain     Omeprazole      Muscle pain     Pantoprazole      Muscle pain     Ranitidine      Body aches     Statins Muscle Pain (Myalgia)     Bactrim [Sulfamethoxazole-Trimethoprim] Rash       SOCIAL HISTORY:  Social History     Tobacco Use     Smoking status: Never     Smokeless tobacco: Never   Vaping Use     Vaping Use: Never used   Substance Use Topics     Alcohol use: Not Currently     Drug use: Never       FAMILY HISTORY:  History reviewed. No pertinent family history.    PHYSICAL EXAM:   General  Alert, oreinted and comfortable  Vital Signs with Ranges  Temp: 97.8  F (36.6  C) Temp src: Oral BP: 109/52 Pulse: 61   Resp: 18 SpO2: 97 % O2 Device: None (Room air) Oxygen Delivery: 1 LPM  I/O last 3 completed shifts:  In: 1670 [P.O.:180; I.V.:1490]  Out: -     Constitutional: healthy, alert and no distress   Cardiovascular: negative, PMI normal. No lifts, heaves, or thrills. RRR. No murmurs, clicks gallops or rub  Respiratory: negative, Percussion normal. Good diaphragmatic excursion. Lungs clear  Abdomen: Abdomen soft, non-tender. BS normal. No masses, organomegaly          ADDITIONAL COMMENTS:   I reviewed the patient's new clinical lab test results.   Recent Labs   Lab Test 07/11/23  0632   WBC 8.5   HGB 11.0*   MCV 95        Recent Labs   Lab Test 07/11/23  0632   POTASSIUM 5.3   CHLORIDE 104   CO2 29   BUN 24.0*   ANIONGAP 8     Recent Labs   Lab Test 07/11/23  0632   ALBUMIN 3.5   BILITOTAL 0.9   ALT 14   AST 16   LIPASE 6*       I reviewed the patient's new imaging results.        CONSULTATION ASSESSMENT AND PLAN:    Thais Vigil is a 82 year old female with a past medical history of chronic recurrent pancreatitis, cholelithiasis s/p cholecystectomy, diabetes, fatty liver, hypertension, hyperlipidemia, GERD, and uncomplicated asthma who presented to [facility] on 7/10/2023 to undergo a scheduled ERCP. She developed pain post operative and admitted for  observation    S/P ERCP  Abdominal pain  Chronic pancreatitis  Patient developed pain post ERCP but otherwise n/c  -7/10 EUS showed dilatation in the common bile duct measuring up to 10 mm, with sonographic changes indicative of severe chronic pancreatitis in the entire pancreas. Pancreatic duct had duct dilatation, hyperechoic walls, visible side branches and tortuous/cachectic appearance.  Pancreatic duct measured up to 7 mm in diameter.    -7/10/23 ERCP noted dilation of pancreatic duct and stricture was found in pancreatic duct. Biliary sphincterotomy was performed in pancreatic duct and CBD. Stents placed in CBD and ventral pancreatic duct  Has had resolution of pain  Lipase 6 and no evidence of acute on chronic pancreatitis. Normal LFTs  Has had recurrent pancreatitis 3 times in last month- however on 24k units of creon TID and likely not sufficient to prevent acute pancreatitis    -- diet to advance as tolerated  -- recommend to continue Creon supplement and Pepcid ( will increase creon after discharge as outpatient)  -- Ok with GI to discharge patient with plans for outpatient f/u in 1-2 weeks  -- Repeat ERCP with stent removal in 6 weeks        [facility].  Office: [phone]  Cell : [phone] (Dr. Foreman)  Cell: [phone] (Kathy Du PA-C)

## 2023-07-11 NOTE — PLAN OF CARE
Goal Outcome Evaluation:  Orientation/Cognitive: A&O  Observation Goals (Met/ Not Met): not met  Mobility Level/Assist Equipment: SBA   Fall Risk (Y/N): Y  Behavior Concerns: N  Pain Management: IV dilaudid x2 for abd/back pain  Tele/VS/O2: VSS on RA  ABNL Lab/BG: no  Diet: CLD , pt did drink  water and few bites of jello but she developed pain with nausea, has been NPO since 2340 and reported feeling much better this morning  Bowel/Bladder: Voiding adequately  Skin Concerns: Scabs to elbows from recent fall  Drains/Devices: PIV infusing LR at 100 ml/hr  Tests/Procedures for next shift: no  Anticipated DC date & active delays: TBD     Observation goals  PRIOR TO DISCHARGE       Comments: -diagnostic tests and consults completed and resulted  Not met  -vital signs normal or at patient baseline Met  -tolerating oral intake to maintain hydration not met   -adequate pain control on oral analgesics Met  -returns to baseline functional status SBA  Nurse to notify provider when observation goals have been met and patient is ready for discharge.

## 2023-07-11 NOTE — PROVIDER NOTIFICATION
MD Notification    Notified Person: MD    Notified Person Name:Gastroenterology on call provider    Notification Date/Time:  7-10 1915  Notification Interaction:  page  Purpose of Notification:Diet order, IV fluids, IV pain med. Please clarify orders per prev RN notes    Orders Received:    Comments:

## 2023-07-11 NOTE — PROGRESS NOTES
Observation goals  PRIOR TO DISCHARGE       Comments: -diagnostic tests and consults completed and resulted MET  -vital signs normal or at patient baseline MET  -tolerating oral intake to maintain hydration MET  -adequate pain control on oral analgesics not met  -returns to baseline functional status MET  Nurse to notify provider when observation goals have been met and patient is ready for discharge.

## 2023-07-11 NOTE — PROGRESS NOTES
Observation goals  PRIOR TO DISCHARGE       Comments: -diagnostic tests and consults completed and resulted  Not met  -vital signs normal or at patient baseline Met  -tolerating oral intake to maintain hydration partially met   -adequate pain control on oral analgesics Met  -returns to baseline functional status SBA  Nurse to notify provider when observation goals have been met and patient is ready for discharge.

## 2023-07-11 NOTE — PROGRESS NOTES
Observation goals  PRIOR TO DISCHARGE       Comments: -diagnostic tests and consults completed and resulted  Not met  -vital signs normal or at patient baseline Met  -tolerating oral intake to maintain hydration not met   -adequate pain control on oral analgesics Met  -returns to baseline functional status SBA  Nurse to notify provider when observation goals have been met and patient is ready for discharge.

## 2023-07-12 ENCOUNTER — PATIENT OUTREACH (OUTPATIENT)
Dept: CARE COORDINATION | Facility: CLINIC | Age: 83
End: 2023-07-12
Payer: MEDICARE

## 2023-07-12 NOTE — PROGRESS NOTES
Clinic Care Coordination Contact  [facility]: Post-Discharge Note  SITUATION                                                      Admission:    Admission Date: 07/10/23   Reason for Admission: Chronic pancreatitis S/p ERCP with CBD and pancreatic duct stent placements.  Hx cholecystectomy  Discharge:   Discharge Date: 07/11/23  Discharge Diagnosis: Chronic pancreatitis S/p ERCP with CBD and pancreatic duct stent placements.  Hx cholecystectomy    BACKGROUND                                                      Per hospital discharge summary and inpatient provider notes:    Thais is a 82 year old female who will be undergoing the above procedure.       A history and physical has been performed. The patient's medications and allergies have been reviewed. The risks and benefits of the procedure and the sedation options and risks were discussed with the patient.  All questions were answered and informed consent was obtained.       She denies a personal or family history of anesthesia complications or bleeding disorders.       ASSESSMENT           Discharge Assessment  How are you doing now that you are home?: Patient states she is feeling pretty good. 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 Tylenol has helped.  How are your symptoms? (Red Flag symptoms escalate to triage hotline per guidelines): Improved  Do you feel your condition is stable enough to be safe at home until your provider visit?: Yes  Does the patient have their discharge instructions? : Yes  Does the patient have questions regarding their discharge instructions? : No  Were you started on any new medications or were there changes to any of your previous medications? : Yes  Does the patient have all of their medications?: Yes  Do you have questions regarding any of your medications? : No  Discharge follow-up appointment scheduled within 14 calendar days? : No  Is patient agreeable to assistance with scheduling? : No          Post-op (Clinicians Only)  Did the patient have surgery or a procedure: Yes  Fever: No  Chills: No  Eating & Drinking: eating and drinking without complaints/concerns  PO Intake: full liquids  Additional Symptoms:  (Denies)  Bowel Function: constipation  Date of last BM: 07/11/23  Urinary Status: voiding without complaint/concerns        PLAN                                                      Outpatient Plan:  Follow up with primary care provider, Maia Christy, within 7 days   for hospital follow- up.  No follow up labs or test are needed.           No future appointments.      For any urgent concerns, please contact our 24 hour nurse triage line: [phone] ([phone])         Katy Vera RN

## (undated) DEVICE — ESU GROUND PAD ADULT W/CORD E7507

## (undated) DEVICE — Device

## (undated) DEVICE — INTR ENDOSCOPIC STENT FUSION OASIS 09.0FRX200CM

## (undated) DEVICE — SOL NACL 0.9% IRRIG 1000ML BOTTLE 2F7124

## (undated) DEVICE — DECANTER BAG 2002S

## (undated) DEVICE — SYR 10ML LL W/O NDL 302995

## (undated) RX ORDER — FENTANYL CITRATE 0.05 MG/ML
INJECTION, SOLUTION INTRAMUSCULAR; INTRAVENOUS
Status: DISPENSED
Start: 2023-07-10

## (undated) RX ORDER — FENTANYL CITRATE 50 UG/ML
INJECTION, SOLUTION INTRAMUSCULAR; INTRAVENOUS
Status: DISPENSED
Start: 2023-07-10